# Patient Record
Sex: FEMALE | Race: WHITE
[De-identification: names, ages, dates, MRNs, and addresses within clinical notes are randomized per-mention and may not be internally consistent; named-entity substitution may affect disease eponyms.]

---

## 2017-05-27 ENCOUNTER — HOSPITAL ENCOUNTER (EMERGENCY)
Dept: HOSPITAL 33 - ED | Age: 52
Discharge: HOME | End: 2017-05-27
Payer: MEDICARE

## 2017-05-27 VITALS — OXYGEN SATURATION: 98 % | HEART RATE: 68 BPM | SYSTOLIC BLOOD PRESSURE: 130 MMHG | DIASTOLIC BLOOD PRESSURE: 62 MMHG

## 2017-05-27 DIAGNOSIS — L23.7: Primary | ICD-10-CM

## 2017-05-27 PROCEDURE — 99282 EMERGENCY DEPT VISIT SF MDM: CPT

## 2017-05-27 NOTE — ERPHSYRPT
- History of Present Illness


Time Seen by Provider: 17 07:26


Source: patient


Exam Limitations: no limitations


Patient Subjective Stated Complaint: PT REPORTS ACCIDENTLY SITTING ON POISON 

IVY WHILE FISHING-REPORTS RASH-STATES SHE IS ALLERGIC TO POISON JAGDISH ET NEEDS A 

SHOT


Triage Nursing Assessment: PT PINK WARM ET DRY-ALERT-RESP EASY ET NONLABORED-

RASH NOTED


Physician History: 





The patient is a 51-year-old female who complains of a rash that itches in her 

groin and  buttocks region for 2 days.  She is allergic to poison ivy and 3 

days ago she was sitting in a patch of poison ivy while fishing.  Now she has 

been very uncomfortable with the rash.


Timing/Duration: yesterday


Quality: itchy


Severity: moderate


Location: genitalia


Possible Causes: poison ivy


Associated Symptoms: rash


Allergies/Adverse Reactions: 








codeine Allergy (Severe, Verified 17 07:15)


 Difficulty Swallowing





Home Medications: 








Clonazepam 0.5 mg*** [Klonopin 0.5 MG***] 0.5 mg PO TID 07/15/16 [History]


Dulaglutide [Trulicity] 1.5 mg SQ WEEKLY 07/15/16 [History]


Fluoxetine HCl [Prozac] 20 mg PO DAILY 07/15/16 [History]


Levothyroxine Sodium [Synthroid] 175 mcg PO DAILY 07/15/16 [History]


Ziprasidone HCl [Geodon] 80 mg PO HS 07/15/16 [History]





Hx Tetanus, Diphtheria Vaccination/Date Given: No


Hx Influenza Vaccination/Date Given: No


Hx Pneumococcal Vaccination/Date Given: No


Immunizations Up to Date: Yes





- Review of Systems


Constitutional: No Fever, No Chills


Eyes: No Symptoms


Ears, Nose, & Throat: No Symptoms


Respiratory: No Cough, No Dyspnea


Cardiac: No Chest Pain, No Edema, No Syncope


Abdominal/Gastrointestinal: No Abdominal Pain, No Nausea, No Vomiting, No 

Diarrhea


Genitourinary Symptoms: No Dysuria


Musculoskeletal: No Back Pain, No Neck Pain


Skin: Rash


Neurological: No Dizziness, No Focal Weakness, No Sensory Changes


Psychological: No Symptoms


Endocrine: No Symptoms


Hematologic/Lymphatic: No Symptoms


Immunological/Allergic: No Symptoms


All Other Systems: Reviewed and Negative





- Past Medical History


Pertinent Past Medical History: Yes


Neurological History: Seizures


ENT History: No Pertinent History


Cardiac History: High Cholesterol, Hypertension


Respiratory History: No Pertinent History


Endocrine Medical History: Diabetes Type II, Thyroid Cancer


Musculoskeletal History: No Pertinent History


GI Medical History: No Pertinent History


 History: No Pertinent History


Psycho-Social History: Bipolar, Depression


Female Reproductive Disorders: No Pertinent History


Other Medical History: BIPOLAR 1





- Past Surgical History


Past Surgical History: Yes


Neuro Surgical History: No Pertinent History


Cardiac: Other


Respiratory: No Pertinent History


Gastrointestinal: Cholecystectomy


Genitourinary: No Pertinent History


Musculoskeletal: No Pertinent History


Female Surgical History:  Section, Hysterectomy


Other Surgical History: THYROIDectomy, cornea transplant





- Social History


Smoking Status: Current every day smoker


How long have you smoked: 36 years


Exposure to second hand smoke: No


Alcohol Use: None


Drug Use: none


Patient Lives Alone: No


Significant Family History: no pertinent family hx, diabetes, hypertension





- Female History


Hx Last Menstrual Period: HYSTERECTOMY


Hx Pregnant Now: No





- Nursing Vital Signs


Nursing Vital Signs: 





 Initial Vital Signs











Temperature                    98.8 F


 


Temperature Source             Oral


 


Pulse Rate                     71


 


Respiratory Rate               18


 


Blood Pressure [Right Arm]     135/82


 


Pain Intensity                 0

















- Physical Exam


General Appearance: mild distress


Eye Exam: PERRL/EOMI, eyes nml inspection


Ears, Nose, Throat Exam: normal ENT inspection, pharynx normal, moist mucous 

membranes


Neck Exam: normal inspection, non-tender, supple, full range of motion


Respiratory Exam: normal breath sounds, lungs clear, No respiratory distress


Cardiovascular Exam: regular rate/rhythm, normal heart sounds


Gastrointestinal/Abdomen Exam: soft, mass, No tenderness


Pelvic Exam: not done


Rectal Exam: not done


Back Exam: normal inspection, normal range of motion, No CVA tenderness, No 

vertebral tenderness


Extremity Exam: normal inspection, normal range of motion


Neurologic Exam: alert, oriented x 3, cooperative, normal mood/affect, 

sensation nml, No motor deficits


Skin Exam: rash (Examination of the inner thighs bilaterally reveals a red rash 

with vesicles consistent with poison ivy contact dermatitis.)


**SpO2 Interpretation**: normal


SpO2: 97


Oxygen Delivery: Room Air





- Departure


Time of Disposition: 07:39


Departure Disposition: Home


Clinical Impression: 


 Contact dermatitis due to poison ivy





Condition: Stable


Critical Care Time: No


Additional Instructions: 


You have contact dermatitis that was caused by poison ivy contact.  You were 

given a prescription for prednisone 60 mg daily for 5 days.  Happy fishing.


Prescriptions: 


Prednisone 10 mg*** [Deltasone 10 mg***] 60 mg PO UD #30 tablet

## 2019-12-27 ENCOUNTER — HOSPITAL ENCOUNTER (OUTPATIENT)
Dept: HOSPITAL 33 - SDC | Age: 54
Discharge: HOME | End: 2019-12-27
Attending: FAMILY MEDICINE
Payer: MEDICARE

## 2019-12-27 VITALS — OXYGEN SATURATION: 96 % | DIASTOLIC BLOOD PRESSURE: 75 MMHG | HEART RATE: 80 BPM | SYSTOLIC BLOOD PRESSURE: 120 MMHG

## 2019-12-27 DIAGNOSIS — J38.4: Primary | ICD-10-CM

## 2019-12-27 DIAGNOSIS — R13.10: ICD-10-CM

## 2019-12-27 DIAGNOSIS — Z85.89: ICD-10-CM

## 2019-12-27 DIAGNOSIS — Z79.899: ICD-10-CM

## 2019-12-27 DIAGNOSIS — I10: ICD-10-CM

## 2019-12-27 DIAGNOSIS — E11.9: ICD-10-CM

## 2019-12-27 PROCEDURE — 82962 GLUCOSE BLOOD TEST: CPT

## 2019-12-27 NOTE — OP
SURGERY DATE:    12/27/19



SURGERY TIME:      0745



PREOPERATIVE DIAGNOSIS:    

1.  HISTORY OF THROAT CANCER.

2.  DYSPHAGIA.



POSTOPERATIVE DIAGNOSIS:    

1.  PRELARYNGEAL EDEMA, OTHERWISE NORMAL EXAM.



PROCEDURE:    

1.  Esophagogastroduodenoscopy.



SURGEON:    Dr. Watts.



ANESTHESIA:    MAC. Given by the Anesthesia Department.



BRIEF HISTORY:    The patient is a 53 y/o WF presenting now for endoscopic evaluation due 
to her issues with dysphagia to rule out stricture. The patient was appraised of the risks 
of the procedure including the risk of perforation, phlebitis, untoward reaction to 
medication, bleeding, and missed lesions.  The patient verbalized her understanding and 
desired to have the procedure performed.



DESCRIPTION OF PROCEDURE:    The patient was given the medications by the Anesthesia 
Department. She had continuous pulse oximetry, ECG monitoring, intermittent BP monitoring, 
and end tidal CO2 monitoring during the examination. She was placed in the left lateral 
decubitus position.  A bite block was placed and the flexible Olympus gastroscope was used 
to intubate the oropharynx.  A view of the larynx was obtained and appeared to have areas 
of edema in the areas proximal to the vocal cords. We were however able to easily intubate 
the esophagus which appeared to be otherwise normal throughout its length without any 
evidence of any strictures.  The scope was passed in the stomach where normal gastric 
rugal folds were seen and these distended nicely with the insufflation of air. The scope 
was passed along the greater curvature of the stomach to the antrum. The pylorus was 
encountered and intubated. The duodenum was inspected and found to be normal. The scope 
was withdrawn towards the stomach. A retroflex view was obtained of the lesser curvature, 
fundus, and cardia regions of the stomach and these appeared to be essentially normal as 
well.  The scope was then withdrawn from the patient with careful inspection upon 
withdrawal. No other mucosal lesions being encountered, the scope was removed from the 
patient who tolerated the procedure well and was sent back to outpatient recovery in good 
condition.

## 2023-05-10 ENCOUNTER — HOSPITAL ENCOUNTER (OUTPATIENT)
Dept: HOSPITAL 33 - ED | Age: 58
Setting detail: OBSERVATION
LOS: 2 days | Discharge: HOME | End: 2023-05-12
Attending: FAMILY MEDICINE | Admitting: FAMILY MEDICINE
Payer: MEDICARE

## 2023-05-10 DIAGNOSIS — Z72.0: ICD-10-CM

## 2023-05-10 DIAGNOSIS — Z79.899: ICD-10-CM

## 2023-05-10 DIAGNOSIS — Z20.828: ICD-10-CM

## 2023-05-10 DIAGNOSIS — I10: ICD-10-CM

## 2023-05-10 DIAGNOSIS — J44.1: ICD-10-CM

## 2023-05-10 DIAGNOSIS — I48.20: Primary | ICD-10-CM

## 2023-05-10 DIAGNOSIS — C73: ICD-10-CM

## 2023-05-10 DIAGNOSIS — E11.9: ICD-10-CM

## 2023-05-10 LAB
ALBUMIN SERPL-MCNC: 4.4 G/DL (ref 3.5–5)
ALP SERPL-CCNC: 70 U/L (ref 38–126)
ALT SERPL-CCNC: 29 U/L (ref 0–35)
ANION GAP SERPL CALC-SCNC: 15.6 MEQ/L (ref 5–15)
APTT PPP: 24.5 SECONDS (ref 25.1–36.5)
AST SERPL QL: 34 U/L (ref 14–36)
BASOPHILS # BLD AUTO: 0.1 X10^3/UL (ref 0–0.4)
BASOPHILS NFR BLD AUTO: 1.1 % (ref 0–0.4)
BILIRUB BLD-MCNC: 0.5 MG/DL (ref 0.2–1.3)
BUN SERPL-MCNC: 16 MG/DL (ref 7–17)
CALCIUM SPEC-MCNC: 8.6 MG/DL (ref 8.4–10.2)
CHLORIDE SERPL-SCNC: 104 MMOL/L (ref 98–107)
CO2 SERPL-SCNC: 28 MMOL/L (ref 22–30)
CREAT SERPL-MCNC: 1.19 MG/DL (ref 0.52–1.04)
EOSINOPHIL # BLD AUTO: 0.24 X10^3/UL (ref 0–0.5)
FLUAV AG NPH QL IA: NEGATIVE
FLUBV AG NPH QL IA: NEGATIVE
GFR SERPLBLD BASED ON 1.73 SQ M-ARVRAT: 49.5 ML/MIN
GLUCOSE SERPL-MCNC: 115 MG/DL (ref 74–106)
HCT VFR BLD AUTO: 46.6 % (ref 35–47)
HGB BLD-MCNC: 14.5 G/DL (ref 12–16)
IMM GRANULOCYTES # BLD: 0.1 X10^3U/L (ref 0–0.03)
IMM GRANULOCYTES NFR BLD: 1.1 % (ref 0–0.4)
INR PPP: 0.94 (ref 0.8–3)
LYMPHOCYTES # SPEC AUTO: 3.28 X10^3/UL (ref 1–4.6)
MCH RBC QN AUTO: 29.7 PG (ref 26–32)
MCHC RBC AUTO-ENTMCNC: 31.1 G/DL (ref 32–36)
MONOCYTES # BLD AUTO: 0.61 X10^3/UL (ref 0–1.3)
NRBC # BLD AUTO: 0 X10^3U/L (ref 0–0.01)
NRBC BLD AUTO-RTO: 0 % (ref 0–0.1)
NT-PROBNP SERPL-MCNC: 1510 PG/ML (ref ?–300)
PLATELET # BLD AUTO: 259 X10^3/UL (ref 150–450)
POTASSIUM SERPLBLD-SCNC: 4.3 MMOL/L (ref 3.5–5.1)
PROT SERPL-MCNC: 8.1 G/DL (ref 6.3–8.2)
PROTHROMBIN TIME: 10.3 SECONDS (ref 9.4–12.5)
RBC # BLD AUTO: 4.88 X10^6/UL (ref 4.1–5.4)
RSV AG SPEC QL IA: NEGATIVE
SARS-COV-2 AG RESP QL IA.RAPID: NEGATIVE
SODIUM SERPL-SCNC: 144 MMOL/L (ref 137–145)
WBC # BLD AUTO: 9 X10^3/UL (ref 4–10.5)

## 2023-05-10 PROCEDURE — 93306 TTE W/DOPPLER COMPLETE: CPT

## 2023-05-10 PROCEDURE — 0241U: CPT

## 2023-05-10 PROCEDURE — 93268 ECG RECORD/REVIEW: CPT

## 2023-05-10 PROCEDURE — 71260 CT THORAX DX C+: CPT

## 2023-05-10 PROCEDURE — 82947 ASSAY GLUCOSE BLOOD QUANT: CPT

## 2023-05-10 PROCEDURE — 85379 FIBRIN DEGRADATION QUANT: CPT

## 2023-05-10 PROCEDURE — 94640 AIRWAY INHALATION TREATMENT: CPT

## 2023-05-10 PROCEDURE — 80048 BASIC METABOLIC PNL TOTAL CA: CPT

## 2023-05-10 PROCEDURE — G0378 HOSPITAL OBSERVATION PER HR: HCPCS

## 2023-05-10 PROCEDURE — 84439 ASSAY OF FREE THYROXINE: CPT

## 2023-05-10 PROCEDURE — 85610 PROTHROMBIN TIME: CPT

## 2023-05-10 PROCEDURE — 96374 THER/PROPH/DIAG INJ IV PUSH: CPT

## 2023-05-10 PROCEDURE — 83735 ASSAY OF MAGNESIUM: CPT

## 2023-05-10 PROCEDURE — 85025 COMPLETE CBC W/AUTO DIFF WBC: CPT

## 2023-05-10 PROCEDURE — 99292 CRITICAL CARE ADDL 30 MIN: CPT

## 2023-05-10 PROCEDURE — 84484 ASSAY OF TROPONIN QUANT: CPT

## 2023-05-10 PROCEDURE — 83721 ASSAY OF BLOOD LIPOPROTEIN: CPT

## 2023-05-10 PROCEDURE — 99291 CRITICAL CARE FIRST HOUR: CPT

## 2023-05-10 PROCEDURE — 83880 ASSAY OF NATRIURETIC PEPTIDE: CPT

## 2023-05-10 PROCEDURE — 83605 ASSAY OF LACTIC ACID: CPT

## 2023-05-10 PROCEDURE — 84443 ASSAY THYROID STIM HORMONE: CPT

## 2023-05-10 PROCEDURE — 80061 LIPID PANEL: CPT

## 2023-05-10 PROCEDURE — 83036 HEMOGLOBIN GLYCOSYLATED A1C: CPT

## 2023-05-10 PROCEDURE — 96372 THER/PROPH/DIAG INJ SC/IM: CPT

## 2023-05-10 PROCEDURE — 96376 TX/PRO/DX INJ SAME DRUG ADON: CPT

## 2023-05-10 PROCEDURE — 36415 COLL VENOUS BLD VENIPUNCTURE: CPT

## 2023-05-10 PROCEDURE — 71045 X-RAY EXAM CHEST 1 VIEW: CPT

## 2023-05-10 PROCEDURE — 80053 COMPREHEN METABOLIC PANEL: CPT

## 2023-05-10 PROCEDURE — 99285 EMERGENCY DEPT VISIT HI MDM: CPT

## 2023-05-10 PROCEDURE — 85730 THROMBOPLASTIN TIME PARTIAL: CPT

## 2023-05-10 RX ADMIN — ISODIUM CHLORIDE SCH ML: 0.03 SOLUTION RESPIRATORY (INHALATION) at 18:37

## 2023-05-10 RX ADMIN — DOXEPIN HYDROCHLORIDE SCH MG: 25 CAPSULE ORAL at 21:17

## 2023-05-10 RX ADMIN — NICOTINE SCH MG: 21 PATCH, EXTENDED RELEASE TRANSDERMAL at 18:13

## 2023-05-10 RX ADMIN — WATER SCH MG: 1 INJECTION INTRAMUSCULAR; INTRAVENOUS; SUBCUTANEOUS at 21:06

## 2023-05-10 RX ADMIN — FLUTICASONE PROPIONATE AND SALMETEROL XINAFOATE SCH PUFF: 115; 21 AEROSOL, METERED RESPIRATORY (INHALATION) at 18:44

## 2023-05-10 RX ADMIN — SIMVASTATIN SCH MG: 20 TABLET, FILM COATED ORAL at 21:07

## 2023-05-10 RX ADMIN — DILTIAZEM HYDROCHLORIDE PRN MLS/HR: 100 INJECTION, POWDER, LYOPHILIZED, FOR SOLUTION INTRAVENOUS at 20:01

## 2023-05-10 RX ADMIN — LEVOFLOXACIN SCH MG: 500 TABLET, FILM COATED ORAL at 17:42

## 2023-05-10 RX ADMIN — DILTIAZEM HYDROCHLORIDE PRN MLS/HR: 100 INJECTION, POWDER, LYOPHILIZED, FOR SOLUTION INTRAVENOUS at 19:54

## 2023-05-10 RX ADMIN — DILTIAZEM HYDROCHLORIDE PRN MLS/HR: 100 INJECTION, POWDER, LYOPHILIZED, FOR SOLUTION INTRAVENOUS at 11:10

## 2023-05-10 RX ADMIN — ISODIUM CHLORIDE SCH ML: 0.03 SOLUTION RESPIRATORY (INHALATION) at 23:10

## 2023-05-10 RX ADMIN — PANTOPRAZOLE SODIUM SCH MG: 40 INJECTION, POWDER, FOR SOLUTION INTRAVENOUS at 17:42

## 2023-05-10 RX ADMIN — LEVALBUTEROL HYDROCHLORIDE SCH MG: 1.25 SOLUTION, CONCENTRATE RESPIRATORY (INHALATION) at 23:10

## 2023-05-10 RX ADMIN — LEVALBUTEROL HYDROCHLORIDE SCH MG: 1.25 SOLUTION, CONCENTRATE RESPIRATORY (INHALATION) at 18:38

## 2023-05-10 NOTE — PCM.HP
History of Present Illness





- Chief Complaint


Chief Complaint: Aflutter w RVR


History of Present Illness: 


 is a 57 year old female who was sent to the ER by Dr Woo's 

office today, she has been having symptoms for the last 3-4 months of shortness 

of breath with some intermittent chest pain and dizziness. She was found to be a

flutter with RVR today on arrival, she is currently on a cardizem drip at 

10mg/hr with rate controlled in the 80's and she is feeling much better. she has

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

eye and hypertension. she has no cardiac history.








- Review of Systems


Constitutional: No Fever, No Chills


Respiratory: Short Of Breath


Cardiac: Chest Pain, Palpitations


Abdominal/Gastrointestinal: No Abdominal Pain, No Nausea, No Vomiting, No 

Diarrhea


Genitourinary Symptoms: No Dysuria


All Other Systems: Reviewed and Negative





Medications & Allergies


Home Medications: 


                              Home Medication List





Aripiprazole 10 mg*** [Abilify 10 MG***] 10 mg PO DAILY 19 [History 

Confirmed 05/10/23]


Doxepin HCl 75 mg PO HS 19 [History Confirmed 05/10/23]


Levothyroxine Sodium [Unithroid] 150 mcg PO 0600 19 [History Confirmed 

05/10/23]


Linagliptin [Tradjenta] 5 mg PO DAILY 19 [History Confirmed 05/10/23]


Vortioxetine Hydrobromide [Trintellix] 20 mg PO DAILY 19 [History 

Confirmed 05/10/23]


clonazePAM [Klonopin] 1 mg PO TID 19 [History Confirmed 05/10/23]


Alendronate Sodium [Fosamax] 70 mg PO WEEKLY 05/10/23 [History Confirmed 

05/10/23]


Cyclobenzaprine HCl 10 mg*** [Cyclobenzaprine 10 MG***] 10 mg PO BID 05/10/23 

[History Confirmed 05/10/23]


Fenofibrate 54 mg PO DAILY 05/10/23 [History Confirmed 05/10/23]


Fluticasone/Vilanterol [Breo Ellipta 200-25 Mcg INH] 1 each IH DAILY 05/10/23 

[History Confirmed 05/10/23]


Lisinopril 10 mg*** [Zestril 10 MG***] 10 mg PO DAILY 05/10/23 [History 

Confirmed 05/10/23]


Metformin HCl 500 mg*** [Glucophage 500 MG***] 500 mg PO BID 05/10/23 [History 

Confirmed 05/10/23]


Nitroglycerin 0.4 mg Tablet*** [Nitrostat 0.4 MG Tablet***] 0.4 mg SL Q5MIN PRN 

MR X 3 PRN 05/10/23 [History Confirmed 05/10/23]


Rosuvastatin Calcium 10 mg PO DAILY 05/10/23 [History Confirmed 05/10/23]








Allergies/Adverse Reactions: 


                                    Allergies











Allergy/AdvReac Type Severity Reaction Status Date / Time


 


codeine Allergy Severe Shortness Verified 05/10/23 10:59





   of Breath  


 


morphine Allergy   Verified 05/10/23 10:59














- Past Medical History


Past Medical History: Yes


Neurological History: No Pertinent History


ENT History: Other


Cardiac History: Hypertension


Respiratory History: Other


Endocrine Medical History: Diabetes Type II, Thyroid Cancer


Musculoskelatal History: Fractures


GI Medical History: Gallbladder Disease


 History: No Pertinent History


Pyscho-Social History: Anxiety, Other


Reproductive Disorders: No Pertinent History


Comment: Blind R eye, degenerative eye disease, multiple personality disorder,





- Past Surgical History


Past Surgical History: Yes


Neuro Surgical History: No Pertinent History


Cardiac History: No Pertinent History


Respiratory Surgery: No Pertinent History


GI Surgical History: Cholecystectomy


Genitourinary Surgical Hx: No Pertinent History


Musculskeletal Surgical Hx: No Pertinent History


Female Surgical History: Hysterectomy,  Section


Other Surgical History: hx of radical neck surgery(included removed thyroid, 

scraped vocal cords removed lymph nodes, and removed tisue from near tthe ears 

to the collar bone), hx of regina corneal transplants,





- Social History


Smoking Status: Current every day smoker


How long have you smoked: 41 years


Exposure to second hand smoke: Yes


Alcohol: None


Drug Use: none


Significant Family History: diabetes, hypertension, no pertinent family hx





- Physical Exam


Vital Signs: 


                               Vital Signs - 24 hr











  Temp Pulse Resp BP BP Pulse Ox


 


 05/10/23 15:40   86  17  120/83  


 


 05/10/23 15:29       97


 


 05/10/23 15:11   128 H  24   120/86  97


 


 05/10/23 15:10   117 H  20  120/86  


 


 05/10/23 13:08   70  12  126/88  


 


 05/10/23 13:00   76  19   126/88  94 L


 


 05/10/23 12:10   115 H  23  123/89  


 


 05/10/23 12:00   97 H  19   103/81  95


 


 05/10/23 11:20   103 H  20    97


 


 05/10/23 11:17   116 H  28 H   


 


 05/10/23 11:10   124 H  30 H  141/102  


 


 05/10/23 11:03  97.6 F  149 H  28 H   141/102  97











General Appearance: no apparent distress, obese


Neurologic Exam: alert, oriented x 3


Eye Exam: post op pupil defect (R)


Respiratory Exam: prolonged expirations, wheezing


Cardiovascular Exam: irregular


Gastrointestinal/Abdomen Exam: soft, normal bowel sounds, No tenderness, No mass


Extremity Exam: normal inspection, normal range of motion, pelvis stable


Skin Exam: normal color, warm, dry, No rash





Results





- Labs


Lab/Micro Results: 


                            Lab Results-Last 24 Hours











  05/10/23 05/10/23 05/10/23 Range/Units





  11:07 11:07 11:07 


 


WBC  9.0    (4.0-10.5)  x10^3/uL


 


RBC  4.88    (4.1-5.4)  x10^6/uL


 


Hgb  14.5    (12.0-16.0)  g/dL


 


Hct  46.6    (35-47)  %


 


MCV  95.5    ()  fL


 


MCH  29.7    (26-32)  pg


 


MCHC  31.1 L    (32-36)  g/dL


 


RDW  13.3    (11.5-14.0)  %


 


Plt Count  259    (150-450)  x10^3/uL


 


MPV  10.6    (7.5-11.0)  fL


 


Gran %  51.9    (36.0-66.0)  %


 


Immature Gran % (Auto)  1.1 H    (0.00-0.4)  %


 


Nucleat RBC Rel Count  0.0    (0.00-0.1)  %


 


Eos # (Auto)  0.24    (0-0.5)  x10^3/uL


 


Immature Gran # (Auto)  0.10 H    (0.00-0.03)  x10^3u/L


 


Absolute Lymphs (auto)  3.28    (1.0-4.6)  x10^3/uL


 


Absolute Monos (auto)  0.61    (0.0-1.3)  x10^3/uL


 


Absolute Nucleated RBC  0.00    (0.00-0.01)  x10^3u/L


 


Lymphocytes %  36.4    (24.0-44.0)  %


 


Monocytes %  6.8    (0.0-12.0)  %


 


Eosinophils %  2.7    (0.00-5.0)  %


 


Basophils %  1.1    (0.0-0.4)  %


 


Absolute Granulocytes  4.67    (1.4-6.9)  x10^3/uL


 


Basophils #  0.10    (0-0.4)  x10^3/uL


 


PT    10.3  (9.4-12.5)  SECONDS


 


INR    0.94  (0.8-3.0)  


 


APTT    24.5 L  (25.1-36.5)  SECONDS


 


D-Dimer     (0.0-0.50)  mg/L


 


Sodium   144   (137-145)  mmol/L


 


Potassium   4.3   (3.5-5.1)  mmol/L


 


Chloride   104   ()  mmol/L


 


Carbon Dioxide   28   (22-30)  mmol/L


 


Anion Gap   15.6 H   (5-15)  MEQ/L


 


BUN   16   (7-17)  mg/dL


 


Creatinine   1.19 H   (0.52-1.04)  mg/dL


 


Estimated GFR   49.5   ML/MIN


 


Glucose   115 H   ()  mg/dL


 


POC Glucometer     (74 to 106)  mg/dL


 


Lactic Acid     (0.4-2.0)  


 


Calcium   8.6   (8.4-10.2)  mg/dL


 


Total Bilirubin   0.50   (0.2-1.3)  mg/dL


 


AST   34   (14-36)  U/L


 


ALT   29   (0-35)  U/L


 


Alkaline Phosphatase   70   ()  U/L


 


Troponin I     (0.000-0.034)  ng/mL


 


NT-Pro-B Natriuret Pep   1510   (<300)  pg/mL


 


Serum Total Protein   8.1   (6.3-8.2)  g/dL


 


Albumin   4.4   (3.5-5.0)  g/dL


 


Influenza Type A Ag     (NEGATIVE)  


 


Influenza Type B Ag     (NEGATIVE)  


 


RSV (PCR)     (NEGATIVE)  


 


SARS-CoV-2 (PCR)     (NEGATIVE)  














  05/10/23 05/10/23 05/10/23 Range/Units





  11:07 11:16 11:30 


 


WBC     (4.0-10.5)  x10^3/uL


 


RBC     (4.1-5.4)  x10^6/uL


 


Hgb     (12.0-16.0)  g/dL


 


Hct     (35-47)  %


 


MCV     ()  fL


 


MCH     (26-32)  pg


 


MCHC     (32-36)  g/dL


 


RDW     (11.5-14.0)  %


 


Plt Count     (150-450)  x10^3/uL


 


MPV     (7.5-11.0)  fL


 


Gran %     (36.0-66.0)  %


 


Immature Gran % (Auto)     (0.00-0.4)  %


 


Nucleat RBC Rel Count     (0.00-0.1)  %


 


Eos # (Auto)     (0-0.5)  x10^3/uL


 


Immature Gran # (Auto)     (0.00-0.03)  x10^3u/L


 


Absolute Lymphs (auto)     (1.0-4.6)  x10^3/uL


 


Absolute Monos (auto)     (0.0-1.3)  x10^3/uL


 


Absolute Nucleated RBC     (0.00-0.01)  x10^3u/L


 


Lymphocytes %     (24.0-44.0)  %


 


Monocytes %     (0.0-12.0)  %


 


Eosinophils %     (0.00-5.0)  %


 


Basophils %     (0.0-0.4)  %


 


Absolute Granulocytes     (1.4-6.9)  x10^3/uL


 


Basophils #     (0-0.4)  x10^3/uL


 


PT     (9.4-12.5)  SECONDS


 


INR     (0.8-3.0)  


 


APTT     (25.1-36.5)  SECONDS


 


D-Dimer     (0.0-0.50)  mg/L


 


Sodium     (137-145)  mmol/L


 


Potassium     (3.5-5.1)  mmol/L


 


Chloride     ()  mmol/L


 


Carbon Dioxide     (22-30)  mmol/L


 


Anion Gap     (5-15)  MEQ/L


 


BUN     (7-17)  mg/dL


 


Creatinine     (0.52-1.04)  mg/dL


 


Estimated GFR     ML/MIN


 


Glucose     ()  mg/dL


 


POC Glucometer   130 H   (74 to 106)  mg/dL


 


Lactic Acid    1.6  (0.4-2.0)  


 


Calcium     (8.4-10.2)  mg/dL


 


Total Bilirubin     (0.2-1.3)  mg/dL


 


AST     (14-36)  U/L


 


ALT     (0-35)  U/L


 


Alkaline Phosphatase     ()  U/L


 


Troponin I  < 0.012    (0.000-0.034)  ng/mL


 


NT-Pro-B Natriuret Pep     (<300)  pg/mL


 


Serum Total Protein     (6.3-8.2)  g/dL


 


Albumin     (3.5-5.0)  g/dL


 


Influenza Type A Ag     (NEGATIVE)  


 


Influenza Type B Ag     (NEGATIVE)  


 


RSV (PCR)     (NEGATIVE)  


 


SARS-CoV-2 (PCR)     (NEGATIVE)  














  05/10/23 05/10/23 05/10/23 Range/Units





  11:30 11:36 15:06 


 


WBC     (4.0-10.5)  x10^3/uL


 


RBC     (4.1-5.4)  x10^6/uL


 


Hgb     (12.0-16.0)  g/dL


 


Hct     (35-47)  %


 


MCV     ()  fL


 


MCH     (26-32)  pg


 


MCHC     (32-36)  g/dL


 


RDW     (11.5-14.0)  %


 


Plt Count     (150-450)  x10^3/uL


 


MPV     (7.5-11.0)  fL


 


Gran %     (36.0-66.0)  %


 


Immature Gran % (Auto)     (0.00-0.4)  %


 


Nucleat RBC Rel Count     (0.00-0.1)  %


 


Eos # (Auto)     (0-0.5)  x10^3/uL


 


Immature Gran # (Auto)     (0.00-0.03)  x10^3u/L


 


Absolute Lymphs (auto)     (1.0-4.6)  x10^3/uL


 


Absolute Monos (auto)     (0.0-1.3)  x10^3/uL


 


Absolute Nucleated RBC     (0.00-0.01)  x10^3u/L


 


Lymphocytes %     (24.0-44.0)  %


 


Monocytes %     (0.0-12.0)  %


 


Eosinophils %     (0.00-5.0)  %


 


Basophils %     (0.0-0.4)  %


 


Absolute Granulocytes     (1.4-6.9)  x10^3/uL


 


Basophils #     (0-0.4)  x10^3/uL


 


PT     (9.4-12.5)  SECONDS


 


INR     (0.8-3.0)  


 


APTT     (25.1-36.5)  SECONDS


 


D-Dimer   0.53 H   (0.0-0.50)  mg/L


 


Sodium     (137-145)  mmol/L


 


Potassium     (3.5-5.1)  mmol/L


 


Chloride     ()  mmol/L


 


Carbon Dioxide     (22-30)  mmol/L


 


Anion Gap     (5-15)  MEQ/L


 


BUN     (7-17)  mg/dL


 


Creatinine     (0.52-1.04)  mg/dL


 


Estimated GFR     ML/MIN


 


Glucose     ()  mg/dL


 


POC Glucometer     (74 to 106)  mg/dL


 


Lactic Acid     (0.4-2.0)  


 


Calcium     (8.4-10.2)  mg/dL


 


Total Bilirubin     (0.2-1.3)  mg/dL


 


AST     (14-36)  U/L


 


ALT     (0-35)  U/L


 


Alkaline Phosphatase     ()  U/L


 


Troponin I    < 0.012  (0.000-0.034)  ng/mL


 


NT-Pro-B Natriuret Pep     (<300)  pg/mL


 


Serum Total Protein     (6.3-8.2)  g/dL


 


Albumin     (3.5-5.0)  g/dL


 


Influenza Type A Ag  NEGATIVE    (NEGATIVE)  


 


Influenza Type B Ag  NEGATIVE    (NEGATIVE)  


 


RSV (PCR)  NEGATIVE    (NEGATIVE)  


 


SARS-CoV-2 (PCR)  NEGATIVE    (NEGATIVE)  














  05/10/23 Range/Units





  16:50 


 


WBC   (4.0-10.5)  x10^3/uL


 


RBC   (4.1-5.4)  x10^6/uL


 


Hgb   (12.0-16.0)  g/dL


 


Hct   (35-47)  %


 


MCV   ()  fL


 


MCH   (26-32)  pg


 


MCHC   (32-36)  g/dL


 


RDW   (11.5-14.0)  %


 


Plt Count   (150-450)  x10^3/uL


 


MPV   (7.5-11.0)  fL


 


Gran %   (36.0-66.0)  %


 


Immature Gran % (Auto)   (0.00-0.4)  %


 


Nucleat RBC Rel Count   (0.00-0.1)  %


 


Eos # (Auto)   (0-0.5)  x10^3/uL


 


Immature Gran # (Auto)   (0.00-0.03)  x10^3u/L


 


Absolute Lymphs (auto)   (1.0-4.6)  x10^3/uL


 


Absolute Monos (auto)   (0.0-1.3)  x10^3/uL


 


Absolute Nucleated RBC   (0.00-0.01)  x10^3u/L


 


Lymphocytes %   (24.0-44.0)  %


 


Monocytes %   (0.0-12.0)  %


 


Eosinophils %   (0.00-5.0)  %


 


Basophils %   (0.0-0.4)  %


 


Absolute Granulocytes   (1.4-6.9)  x10^3/uL


 


Basophils #   (0-0.4)  x10^3/uL


 


PT   (9.4-12.5)  SECONDS


 


INR   (0.8-3.0)  


 


APTT   (25.1-36.5)  SECONDS


 


D-Dimer   (0.0-0.50)  mg/L


 


Sodium   (137-145)  mmol/L


 


Potassium   (3.5-5.1)  mmol/L


 


Chloride   ()  mmol/L


 


Carbon Dioxide   (22-30)  mmol/L


 


Anion Gap   (5-15)  MEQ/L


 


BUN   (7-17)  mg/dL


 


Creatinine   (0.52-1.04)  mg/dL


 


Estimated GFR   ML/MIN


 


Glucose   ()  mg/dL


 


POC Glucometer  106  (74 to 106)  mg/dL


 


Lactic Acid   (0.4-2.0)  


 


Calcium   (8.4-10.2)  mg/dL


 


Total Bilirubin   (0.2-1.3)  mg/dL


 


AST   (14-36)  U/L


 


ALT   (0-35)  U/L


 


Alkaline Phosphatase   ()  U/L


 


Troponin I   (0.000-0.034)  ng/mL


 


NT-Pro-B Natriuret Pep   (<300)  pg/mL


 


Serum Total Protein   (6.3-8.2)  g/dL


 


Albumin   (3.5-5.0)  g/dL


 


Influenza Type A Ag   (NEGATIVE)  


 


Influenza Type B Ag   (NEGATIVE)  


 


RSV (PCR)   (NEGATIVE)  


 


SARS-CoV-2 (PCR)   (NEGATIVE)  








                                   Accuchecks











Date                           05/10/23


 


Time                           11:16

















- Radiology Impressions


Radiology Exams & Impressions: 


                              Radiology Procedures











 Category Date Time Status


 


 CHEST 1 VIEW (PORTABLE) Stat Exams  05/10/23 11:01 Completed


 


 CHEST WITH CONTRAST [CT] Stat Exams  05/10/23 13:08 Completed


 


 ECHO W/2D AND DOPPLER [US] Routine Exams  05/10/23 17:03 Ordered














- Other Procedures and Tests


                               Respiratory Therapy





05/10/23 15:33


Oxygen Nasal Cannula 2 lpm 














Assessment/Plan


(1) Atrial flutter with rapid ventricular response


Current Visit: Yes   Status: Acute   


Assessment & Plan: 


rate currently controlled with cardizem drip and anticoagulation ordered lovenox

 1mg/kg q12 hrs, will check thyroid function tests, r/o MI with serial enzymes 

and check echo. with intermittent chest pain and a long list of cardiac risk 

factors she will need evaluated for CAD with stress testing after discharge. 


Code(s): I48.92 - UNSPECIFIED ATRIAL FLUTTER   





(2) COPD exacerbation


Current Visit: Yes   Status: Acute   


Assessment & Plan: 


levaquin and IV solu medrol ordered due to wheezing, dyspnea and oxygen 

requirement.


Code(s): J44.1 - CHRONIC OBSTRUCTIVE PULMONARY DISEASE W (ACUTE) EXACERBATION   





(3) Type 2 diabetes mellitus


Current Visit: No   Status: Acute   


Assessment & Plan: 


sliding scale insulin ordered, will monitor due to steroids.

## 2023-05-10 NOTE — XRAY
Indication: Arrhythmia.  Short of breath.  Pulmonary embolus.



Multiple contiguous axial images obtained through the chest using 100 cc

Isovue 370 contrast and PE protocol.



Comparison: None



Good opacification of the pulmonary arteries to include the lobar and

segmental branches.  No pulmonary embolus.  Heart is not enlarged.  Aorta

minimally arteriosclerotic without aneurysm/dissection.  No pathologic

mediastinal/hilar lymphadenopathy.  Lungs demonstrates mild bibasilar

subsegmental atelectasis/scarring.  Posterior right upper lobe demonstrates 8

x 9 mm indeterminate noncalcified nodule.  No infiltrate, effusion, or

pneumothorax.



Bony thorax intact with minimal degenerative changes throughout the spine.



Limited upper abdomen demonstrates fatty liver and cholecystectomy clips.



Impression:

1.  Negative pulmonary embolus.  No acute cardiopulmonary abnormalities.

2.  Right upper lobe indeterminant noncalcified nodule.  Outside comparison

studies recommended if available.  If not, recommend follow-up per Fleischner

guidelines.

3.  Incidental fatty liver.

## 2023-05-10 NOTE — ERPHSYRPT
- History of Present Illness


Source: patient


Exam Limitations: no limitations


Patient Subjective Stated Complaint: C/O SOB that started a little over a week 

ago. Denies any pain.


Triage Nursing Assessment: Patient came back to ER in a w/c; she came from Dr. Woo's office. She is SOB; labored breathing at rest, unable to be flat in 

bed. She is diaphoretic. Some edema noted to BLE. Lungs diminished but wheezing.


Physician History: 





58 yo WF w h/o DM/HTN/Tobacco abuse presents from Dr. Woo's clinic w 

Aflutter w RVR. Spoke w Dr. Woo who stated that pt was new to him and that 

Aflutter was probably new. He recommended IV Cardizem treatment. Pt states that 

she has had dyspnea x 1+month and palpatations x1+ month. She denies h/o Afib/

Aflutter/MI/CAD/PE. Pt has a chronic cough which has not changed and denies 

chest pain/fever/nausea/vomiting/diarrhea/abdominal pain. Arrived w Aflutter w 

RVR/Rate 147.


Timing/Duration: other (1+ month)


Modifying Factors: Improves With: nothing


Nitro Today/Relief: no nitro taken today


Aspirin Treatment Today: no aspirin today


Allergies/Adverse Reactions: 








codeine Allergy (Severe, Verified 05/10/23 10:59)


   Shortness of Breath


morphine Allergy (Verified 05/10/23 10:59)


   





Home Medications: 








Aripiprazole 10 mg*** [Abilify 10 MG***] 10 mg PO DAILY 19 [History]


Doxepin HCl 75 mg PO HS 19 [History]


Levothyroxine Sodium [Unithroid] 150 mcg PO 0600 19 [History]


Linagliptin [Tradjenta] 5 mg PO DAILY 19 [History]


Vortioxetine Hydrobromide [Trintellix] 20 mg PO DAILY 19 [History]


clonazePAM [Klonopin] 1 mg PO TID 19 [History]


Alendronate Sodium [Fosamax] 70 mg PO WEEKLY 05/10/23 [History]


Cyclobenzaprine HCl 10 mg*** [Cyclobenzaprine 10 MG***] 10 mg PO BID 05/10/23 

[History]


Fenofibrate 54 mg PO DAILY 05/10/23 [History]


Fluticasone/Vilanterol [Breo Ellipta 200-25 Mcg INH] 1 each IH DAILY 05/10/23 

[History]


Lisinopril 10 mg*** [Zestril 10 MG***] 10 mg PO DAILY 05/10/23 [History]


Metformin HCl 500 mg*** [Glucophage 500 MG***] 500 mg PO BID 05/10/23 [History]


Nitroglycerin 0.4 mg Tablet*** [Nitrostat 0.4 MG Tablet***] 0.4 mg SL Q5MIN PRN 

MR X 3 PRN 05/10/23 [History]


Rosuvastatin Calcium 10 mg PO DAILY 05/10/23 [History]





Hx Tetanus, Diphtheria Vaccination/Date Given: Yes


Hx Influenza Vaccination/Date Given: No


Hx Pneumococcal Vaccination/Date Given: No


Immunizations Up to Date: Yes





Travel Risk





- International Travel


Have you traveled outside of the country in past 3 weeks: No





- Coronavirus Screening


Are you exhibiting any of the following symptoms?: Yes


Symptoms: Shortness of Breath


Close contact with a COVID-19 positive Pt in past 14-21 Days: No





- Vaccine Status


Have you recieved a Covid-19 vaccination: Yes


: [employer]





- Review of Systems


Constitutional: No Symptoms, Malaise, Weakness


Eyes: No Symptoms


Ears, Nose, & Throat: No Symptoms


Respiratory: No Symptoms, Cough, Dyspnea, Dyspnea on Exertion (PARIKH)


Cardiac: No Symptoms, Palpitations


Abdominal/Gastrointestinal: No Symptoms


Genitourinary Symptoms: No Symptoms


Musculoskeletal: No Symptoms


Skin: No Symptoms


Neurological: No Symptoms


Psychological: No Symptoms


Endocrine: No Symptoms


Hematologic/Lymphatic: No Symptoms


Immunological/Allergic: No Symptoms





- Past Medical History


Pertinent Past Medical History: Yes


Neurological History: No Pertinent History


ENT History: Other


Cardiac History: Hypertension


Respiratory History: Other


Endocrine Medical History: Diabetes Type II, Thyroid Cancer


Musculoskeletal History: Fractures


GI Medical History: Gallbladder Disease


 History: No Pertinent History


Psycho-Social History: Anxiety, Other


Female Reproductive Disorders: No Pertinent History


Other Medical History: Blind R eye, degenerative eye disease, multiple 

personality disorder,





- Past Surgical History


Past Surgical History: Yes


Neuro Surgical History: No Pertinent History


Cardiac: No Pertinent History


Respiratory: No Pertinent History


Gastrointestinal: Cholecystectomy


Genitourinary: No Pertinent History


Musculoskeletal: No Pertinent History


Female Surgical History: Hysterectomy,  Section


Other Surgical History: hx of radical neck surgery(included removed thyroid, 

scraped vocal cords removed lymph nodes, and removed tisue from near tthe ears 

to the collar bone), hx of regina corneal transplants,





- Social History


Smoking Status: Current every day smoker


How long have you smoked: 41 years


Exposure to second hand smoke: Yes


Alcohol Use: None


Drug Use: none


Patient Lives Alone: No


Significant Family History: diabetes, hypertension, no pertinent family hx





- Nursing Vital Signs


Nursing Vital Signs: 


                               Initial Vital Signs











Temperature  97.6 F   05/10/23 11:03


 


Pulse Rate  149 H  05/10/23 11:03


 


Respiratory Rate  28 H  05/10/23 11:03


 


Blood Pressure  141/102   05/10/23 11:03


 


O2 Sat by Pulse Oximetry  97   05/10/23 11:03








                                   Pain Scale











Pain Intensity                 0











Tachy/Hypertensive





- Physical Exam


General Appearance: moderate distress


Eye Exam: PERRL/EOMI, eyes nml inspection


Ears, Nose, Throat Exam: normal ENT inspection, TMs normal, pharynx normal, 

moist mucous membranes


Neck Exam: normal inspection, non-tender, supple, full range of motion, No 

meningismus, No mass, No Brudzinski, No Kernig's


Respiratory Exam: wheezing (Scattered wheezes/Decreased at bases B)


Cardiovascular Exam: tachycardia, capillary refill <2 sec


Gastrointestinal/Abdomen Exam: soft, normal bowel sounds, No tenderness


Back Exam: normal inspection, normal range of motion


Extremity Exam: normal inspection, normal range of motion


Neurologic Exam: alert, oriented x 3, cooperative, CNs II-XII nml as tested, 

sensation nml


Skin Exam: normal color, warm, dry


Lymphatic Exam: No adenopathy


**SpO2 Interpretation**: normal


SpO2: 97


O2 Delivery: Room Air





- Course


Nursing assessment & vital signs reviewed: Yes


EKG Interpreted by Me: RATE (Aflutter/RVR rate 147/Prolonged QTc/Low voltage)





- Radiology Exams


  ** Chest


X-ray Interpretation: Discussed w/ radiologist (CXR neg)





- CT Exams


  ** Chest


CT Interpretation: Discussed w/radiologist (CTA of chest-No PE/RUL nodule)


Ordered Tests: 


                               Active Orders 24 hr











 Category Date Time Status


 


 Consistent Carbohydrate Diet  Calorie Diet  05/10/23 Dinner Active


 


 CHEST 1 VIEW (PORTABLE) Stat Exams  05/10/23 11:01 Completed


 


 CHEST WITH CONTRAST [CT] Stat Exams  05/10/23 13:08 Completed


 


 CBC W DIFF AM.LAB Lab  23 04:00 Ordered


 


 CBC W DIFF Stat Lab  05/10/23 11:07 Completed


 


 CMP AM.LAB Lab  23 04:00 Ordered


 


 CMP Stat Lab  05/10/23 11:07 Completed


 


 D-DIMER QUANTITATIVE Stat Lab  05/10/23 11:36 Completed


 


 Lactic Acid Stat Lab  05/10/23 11:30 Completed


 


 NT PRO BNPII Stat Lab  05/10/23 11:07 Completed


 


 POCT GLUCOSE Stat Lab  05/10/23 11:16 Completed


 


 PROTIME WITH INR Stat Lab  05/10/23 11:07 Completed


 


 PTT Stat Lab  05/10/23 11:07 Completed


 


 TROPONIN Q4H Lab  05/10/23 11:07 Completed


 


 TROPONIN Q4H Lab  05/10/23 15:06 Completed


 


 TROPONIN Q4H Lab  05/10/23 18:30 Received


 


 Transfer Order Routine Transfer  05/10/23 Completed








Medication Summary











Generic Name Dose Route Start Last Admin





  Trade Name Freq  PRN Reason Stop Dose Admin


 


Aripiprazole  10 mg  23 10:00 





  Aripiprazole 10 Mg Tablet  PO  06/10/23 09:59 





  DAILY ZACK  


 


Clonazepam  1 mg  05/10/23 22:00 





  Clonazepam 2 Mg Tablet  PO  23 21:59 





  TID ZACK  


 


Methylprednisolone Sodium  0 mg  05/10/23 22:00 





Succinate 80 mg/ Sterile Water  IV  23 21:59 





2 ml  Q8HT ZACK  


 


Doxepin HCl  75 mg  05/10/23 22:00 





  Doxepin Hcl 25 Mg Capsule  PO  23 21:59 





  HS ZACK  


 


Enoxaparin Sodium  120 mg  05/10/23 22:00 





  Enoxaparin Sodium*** 120 Mg/0.8 Ml Syringe  SQ  23 21:59 





  Q12HT ZACK  


 


Fenofibrate  54 mg  05/10/23 20:00 





  Fenofibrate,Micronized 145 Mg Tablet  PO  23 19:59 





  DAILY ZACK  


 


Diltiazem HCl  100 mls @ 5 mls/hr  05/10/23 11:00  05/10/23 19:05





  Cardizem Drip 100 Mg/100 Ml D5w  IV  23 10:59  5 mg/hr





  .Q20H PRN   5 mls/hr





  HEART RATE/ A-FIB   Titration





  Protocol  





  5 MG/HR  


 


Insulin Human Lispro  0 unit  05/10/23 15:33 





  Insulin Lispro 1 Unit  SQ  23 15:32 





  UD PRN  





  HYPERGLYCEMIA  


 


Levalbuterol HCl  1.25 mg  05/10/23 19:00  05/10/23 18:38





  Levalbuterol Hcl 1.25 Mg/0.5 Ml Neb    23 18:59  1.25 mg





  Q4HRT ZACK   Administration


 


Levofloxacin  500 mg  05/10/23 18:00  05/10/23 17:42





  Levofloxacin 500 Mg Tablet  PO  23 17:59  500 mg





  DAILY ZACK   Administration


 


Levothyroxine Sodium  150 mcg  23 06:00 





  Levothyroxine Sodium 150 Mcg Tablet  PO  06/10/23 05:59 





  QAM@0600 ZACK  


 


Lisinopril  10 mg  05/10/23 20:00 





  Lisinopril 10 Mg Tablet  PO  23 19:59 





  DAILY ZACK  


 


Metoprolol Tartrate  50 mg  23 15:37  05/10/23 15:41





  Metoprolol Tartrate 50 Mg Tablet  PO  23 15:38  50 mg





  STAT ONE   Administration


 


Nicotine  21 mg  05/10/23 18:00  05/10/23 18:13





  Nicotine 21 Mg/Patch Patch  TOP  23 17:59  21 mg





  Q24H ZACK   Administration


 


Nitroglycerin  0.4 mg  05/10/23 18:09 





  Nitroglycerin 0.4 Mg Tablet Bottle  SL  23 18:08 





  Q5MIN PRN MR X 3 PRN  





  CHEST PAIN  


 


Non-Formulary Medication  1 each  05/10/23 14:35 





  Hold Metformin Products For 48hrs    23 14:35 





  UD ZACK  


 


Ondansetron HCl  4 mg  05/10/23 15:33 





  Ondansetron Hcl 4 Mg/2 Ml Vial  IV  23 15:32 





  Q6H PRN PRN  





  NAUSEA/VOMITING  


 


Pantoprazole Sodium  40 mg  05/10/23 17:00  05/10/23 17:42





  Pantoprazole 40 Mg Vial  IV  23 16:59  40 mg





  Q24H10 ZACK   Administration


 


Fluticasone/Salmeterol  2 puff  05/10/23 19:00  05/10/23 18:44





  Fluticasone/Salmeterol 115/21 - 120 Puff Common Canister    23 18:59  

2 puff





  BIDRT ZACK   Administration


 


Simvastatin  20 mg  05/10/23 22:00 





  Simvastatin 20 Mg Tablet  PO  23 21:59 





  HS ZACK  


 


Sodium Chloride  3 ml  05/10/23 19:00  05/10/23 18:37





  Sodium Cl For Inhalation 3 Ml Ud Nebule    23 18:59  3 ml





  Q4HRT ZACK   Administration














Discontinued Medications














Generic Name Dose Route Start Last Admin





  Trade Name Freq  PRN Reason Stop Dose Admin


 


Albuterol/Ipratropium  3 ml  05/10/23 11:18  05/10/23 11:20





  Ipratropium/Albuterol Sulfate 3 Ml Ampul.Neb  IH  05/10/23 11:19  3 ml





  STAT ONE   Administration


 


Albuterol/Ipratropium  Confirm  05/10/23 11:22 





  Ipratropium/Albuterol Sulfate 3 Ml Ampul.Neb  Administered  05/10/23 11:23 





  Dose  





  3 ml  





  IH  





  .STK-MED ONE  


 


Albuterol/Ipratropium  3 ml  05/10/23 19:00 





  Ipratropium/Albuterol Sulfate 3 Ml Ampul.Neb    23 18:59 





  Q6HRT ZACK  


 


Diltiazem HCl  15 mg  05/10/23 11:00  05/10/23 11:05





  Diltiazem Hcl Iv 5 Mg/Ml Vial  IV  05/10/23 11:01  15 mg





  STAT ONE   Administration


 


Diltiazem HCl  10 mg  05/10/23 12:08  05/10/23 12:31





  Diltiazem Hcl Iv 5 Mg/Ml Vial  IV  05/10/23 12:09  10 mg





  STAT ONE   Administration


 


Diltiazem HCl  Confirm  05/10/23 12:30 





  Diltiazem Hcl Iv 5 Mg/Ml Vial  Administered  05/10/23 12:31 





  Dose  





  50 mg  





  IV  





  .STK-MED ONE  


 


Enoxaparin Sodium  120 mg  05/10/23 15:37  05/10/23 15:41





  Enoxaparin Sodium*** 120 Mg/0.8 Ml Syringe  SQ  05/10/23 15:38  120 mg





  STAT STA   Administration


 


Enoxaparin Sodium  Confirm  05/10/23 15:41 





  Enoxaparin Sodium*** 120 Mg/0.8 Ml Syringe  Administered  05/10/23 15:42 





  Dose  





  120 mg  





  SQ  





  .STK-MED ONE  


 


Metoprolol Tartrate  Confirm  05/10/23 15:40 





  Metoprolol Tartrate 50 Mg Tablet  Administered  05/10/23 15:41 





  Dose  





  50 mg  





  .ROUTE  





  .[employer]-MED ONE  











Lab/Rad Data: 


                           Laboratory Result Diagrams





                                 05/10/23 11:07 





                                 05/10/23 11:07 





                               Laboratory Results











  05/10/23 05/10/23 05/10/23 Range/Units





  15:06 11:36 11:30 


 


WBC     (4.0-10.5)  x10^3/uL


 


RBC     (4.1-5.4)  x10^6/uL


 


Hgb     (12.0-16.0)  g/dL


 


Hct     (35-47)  %


 


MCV     ()  fL


 


MCH     (26-32)  pg


 


MCHC     (32-36)  g/dL


 


RDW     (11.5-14.0)  %


 


Plt Count     (150-450)  x10^3/uL


 


MPV     (7.5-11.0)  fL


 


Gran %     (36.0-66.0)  %


 


Immature Gran % (Auto)     (0.00-0.4)  %


 


Nucleat RBC Rel Count     (0.00-0.1)  %


 


Eos # (Auto)     (0-0.5)  x10^3/uL


 


Immature Gran # (Auto)     (0.00-0.03)  x10^3u/L


 


Absolute Lymphs (auto)     (1.0-4.6)  x10^3/uL


 


Absolute Monos (auto)     (0.0-1.3)  x10^3/uL


 


Absolute Nucleated RBC     (0.00-0.01)  x10^3u/L


 


Lymphocytes %     (24.0-44.0)  %


 


Monocytes %     (0.0-12.0)  %


 


Eosinophils %     (0.00-5.0)  %


 


Basophils %     (0.0-0.4)  %


 


Absolute Granulocytes     (1.4-6.9)  x10^3/uL


 


Basophils #     (0-0.4)  x10^3/uL


 


PT     (9.4-12.5)  SECONDS


 


INR     (0.8-3.0)  


 


APTT     (25.1-36.5)  SECONDS


 


D-Dimer   0.53 H   (0.0-0.50)  mg/L


 


Sodium     (137-145)  mmol/L


 


Potassium     (3.5-5.1)  mmol/L


 


Chloride     ()  mmol/L


 


Carbon Dioxide     (22-30)  mmol/L


 


Anion Gap     (5-15)  MEQ/L


 


BUN     (7-17)  mg/dL


 


Creatinine     (0.52-1.04)  mg/dL


 


Estimated GFR     ML/MIN


 


Glucose     ()  mg/dL


 


POC Glucometer     (74 to 106)  mg/dL


 


Hemoglobin A1c     (4.5-6.0)  %


 


Lactic Acid     (0.4-2.0)  


 


Calcium     (8.4-10.2)  mg/dL


 


Total Bilirubin     (0.2-1.3)  mg/dL


 


AST     (14-36)  U/L


 


ALT     (0-35)  U/L


 


Alkaline Phosphatase     ()  U/L


 


Troponin I  < 0.012    (0.000-0.034)  ng/mL


 


NT-Pro-B Natriuret Pep     (<300)  pg/mL


 


Serum Total Protein     (6.3-8.2)  g/dL


 


Albumin     (3.5-5.0)  g/dL


 


Free T4     (0.78-2.19)  ng/dL


 


TSH 3rd Generation     (0.47-4.68)  mIU/L


 


Influenza Type A Ag    NEGATIVE  (NEGATIVE)  


 


Influenza Type B Ag    NEGATIVE  (NEGATIVE)  


 


RSV (PCR)    NEGATIVE  (NEGATIVE)  


 


SARS-CoV-2 (PCR)    NEGATIVE  (NEGATIVE)  














  05/10/23 05/10/23 05/10/23 Range/Units





  11:30 11:16 11:07 


 


WBC     (4.0-10.5)  x10^3/uL


 


RBC     (4.1-5.4)  x10^6/uL


 


Hgb     (12.0-16.0)  g/dL


 


Hct     (35-47)  %


 


MCV     ()  fL


 


MCH     (26-32)  pg


 


MCHC     (32-36)  g/dL


 


RDW     (11.5-14.0)  %


 


Plt Count     (150-450)  x10^3/uL


 


MPV     (7.5-11.0)  fL


 


Gran %     (36.0-66.0)  %


 


Immature Gran % (Auto)     (0.00-0.4)  %


 


Nucleat RBC Rel Count     (0.00-0.1)  %


 


Eos # (Auto)     (0-0.5)  x10^3/uL


 


Immature Gran # (Auto)     (0.00-0.03)  x10^3u/L


 


Absolute Lymphs (auto)     (1.0-4.6)  x10^3/uL


 


Absolute Monos (auto)     (0.0-1.3)  x10^3/uL


 


Absolute Nucleated RBC     (0.00-0.01)  x10^3u/L


 


Lymphocytes %     (24.0-44.0)  %


 


Monocytes %     (0.0-12.0)  %


 


Eosinophils %     (0.00-5.0)  %


 


Basophils %     (0.0-0.4)  %


 


Absolute Granulocytes     (1.4-6.9)  x10^3/uL


 


Basophils #     (0-0.4)  x10^3/uL


 


PT     (9.4-12.5)  SECONDS


 


INR     (0.8-3.0)  


 


APTT     (25.1-36.5)  SECONDS


 


D-Dimer     (0.0-0.50)  mg/L


 


Sodium     (137-145)  mmol/L


 


Potassium     (3.5-5.1)  mmol/L


 


Chloride     ()  mmol/L


 


Carbon Dioxide     (22-30)  mmol/L


 


Anion Gap     (5-15)  MEQ/L


 


BUN     (7-17)  mg/dL


 


Creatinine     (0.52-1.04)  mg/dL


 


Estimated GFR     ML/MIN


 


Glucose     ()  mg/dL


 


POC Glucometer   130 H   (74 to 106)  mg/dL


 


Hemoglobin A1c    6.16 H  (4.5-6.0)  %


 


Lactic Acid  1.6    (0.4-2.0)  


 


Calcium     (8.4-10.2)  mg/dL


 


Total Bilirubin     (0.2-1.3)  mg/dL


 


AST     (14-36)  U/L


 


ALT     (0-35)  U/L


 


Alkaline Phosphatase     ()  U/L


 


Troponin I     (0.000-0.034)  ng/mL


 


NT-Pro-B Natriuret Pep     (<300)  pg/mL


 


Serum Total Protein     (6.3-8.2)  g/dL


 


Albumin     (3.5-5.0)  g/dL


 


Free T4     (0.78-2.19)  ng/dL


 


TSH 3rd Generation     (0.47-4.68)  mIU/L


 


Influenza Type A Ag     (NEGATIVE)  


 


Influenza Type B Ag     (NEGATIVE)  


 


RSV (PCR)     (NEGATIVE)  


 


SARS-CoV-2 (PCR)     (NEGATIVE)  














  05/10/23 05/10/23 05/10/23 Range/Units





  11:07 11:07 11:07 


 


WBC     (4.0-10.5)  x10^3/uL


 


RBC     (4.1-5.4)  x10^6/uL


 


Hgb     (12.0-16.0)  g/dL


 


Hct     (35-47)  %


 


MCV     ()  fL


 


MCH     (26-32)  pg


 


MCHC     (32-36)  g/dL


 


RDW     (11.5-14.0)  %


 


Plt Count     (150-450)  x10^3/uL


 


MPV     (7.5-11.0)  fL


 


Gran %     (36.0-66.0)  %


 


Immature Gran % (Auto)     (0.00-0.4)  %


 


Nucleat RBC Rel Count     (0.00-0.1)  %


 


Eos # (Auto)     (0-0.5)  x10^3/uL


 


Immature Gran # (Auto)     (0.00-0.03)  x10^3u/L


 


Absolute Lymphs (auto)     (1.0-4.6)  x10^3/uL


 


Absolute Monos (auto)     (0.0-1.3)  x10^3/uL


 


Absolute Nucleated RBC     (0.00-0.01)  x10^3u/L


 


Lymphocytes %     (24.0-44.0)  %


 


Monocytes %     (0.0-12.0)  %


 


Eosinophils %     (0.00-5.0)  %


 


Basophils %     (0.0-0.4)  %


 


Absolute Granulocytes     (1.4-6.9)  x10^3/uL


 


Basophils #     (0-0.4)  x10^3/uL


 


PT     (9.4-12.5)  SECONDS


 


INR     (0.8-3.0)  


 


APTT     (25.1-36.5)  SECONDS


 


D-Dimer     (0.0-0.50)  mg/L


 


Sodium     (137-145)  mmol/L


 


Potassium     (3.5-5.1)  mmol/L


 


Chloride     ()  mmol/L


 


Carbon Dioxide     (22-30)  mmol/L


 


Anion Gap     (5-15)  MEQ/L


 


BUN     (7-17)  mg/dL


 


Creatinine     (0.52-1.04)  mg/dL


 


Estimated GFR     ML/MIN


 


Glucose     ()  mg/dL


 


POC Glucometer     (74 to 106)  mg/dL


 


Hemoglobin A1c     (4.5-6.0)  %


 


Lactic Acid     (0.4-2.0)  


 


Calcium     (8.4-10.2)  mg/dL


 


Total Bilirubin     (0.2-1.3)  mg/dL


 


AST     (14-36)  U/L


 


ALT     (0-35)  U/L


 


Alkaline Phosphatase     ()  U/L


 


Troponin I    < 0.012  (0.000-0.034)  ng/mL


 


NT-Pro-B Natriuret Pep     (<300)  pg/mL


 


Serum Total Protein     (6.3-8.2)  g/dL


 


Albumin     (3.5-5.0)  g/dL


 


Free T4  2.12    (0.78-2.19)  ng/dL


 


TSH 3rd Generation   3.150   (0.47-4.68)  mIU/L


 


Influenza Type A Ag     (NEGATIVE)  


 


Influenza Type B Ag     (NEGATIVE)  


 


RSV (PCR)     (NEGATIVE)  


 


SARS-CoV-2 (PCR)     (NEGATIVE)  














  05/10/23 05/10/23 05/10/23 Range/Units





  11:07 11:07 11:07 


 


WBC    9.0  (4.0-10.5)  x10^3/uL


 


RBC    4.88  (4.1-5.4)  x10^6/uL


 


Hgb    14.5  (12.0-16.0)  g/dL


 


Hct    46.6  (35-47)  %


 


MCV    95.5  ()  fL


 


MCH    29.7  (26-32)  pg


 


MCHC    31.1 L  (32-36)  g/dL


 


RDW    13.3  (11.5-14.0)  %


 


Plt Count    259  (150-450)  x10^3/uL


 


MPV    10.6  (7.5-11.0)  fL


 


Gran %    51.9  (36.0-66.0)  %


 


Immature Gran % (Auto)    1.1 H  (0.00-0.4)  %


 


Nucleat RBC Rel Count    0.0  (0.00-0.1)  %


 


Eos # (Auto)    0.24  (0-0.5)  x10^3/uL


 


Immature Gran # (Auto)    0.10 H  (0.00-0.03)  x10^3u/L


 


Absolute Lymphs (auto)    3.28  (1.0-4.6)  x10^3/uL


 


Absolute Monos (auto)    0.61  (0.0-1.3)  x10^3/uL


 


Absolute Nucleated RBC    0.00  (0.00-0.01)  x10^3u/L


 


Lymphocytes %    36.4  (24.0-44.0)  %


 


Monocytes %    6.8  (0.0-12.0)  %


 


Eosinophils %    2.7  (0.00-5.0)  %


 


Basophils %    1.1  (0.0-0.4)  %


 


Absolute Granulocytes    4.67  (1.4-6.9)  x10^3/uL


 


Basophils #    0.10  (0-0.4)  x10^3/uL


 


PT  10.3    (9.4-12.5)  SECONDS


 


INR  0.94    (0.8-3.0)  


 


APTT  24.5 L    (25.1-36.5)  SECONDS


 


D-Dimer     (0.0-0.50)  mg/L


 


Sodium   144   (137-145)  mmol/L


 


Potassium   4.3   (3.5-5.1)  mmol/L


 


Chloride   104   ()  mmol/L


 


Carbon Dioxide   28   (22-30)  mmol/L


 


Anion Gap   15.6 H   (5-15)  MEQ/L


 


BUN   16   (7-17)  mg/dL


 


Creatinine   1.19 H   (0.52-1.04)  mg/dL


 


Estimated GFR   49.5   ML/MIN


 


Glucose   115 H   ()  mg/dL


 


POC Glucometer     (74 to 106)  mg/dL


 


Hemoglobin A1c     (4.5-6.0)  %


 


Lactic Acid     (0.4-2.0)  


 


Calcium   8.6   (8.4-10.2)  mg/dL


 


Total Bilirubin   0.50   (0.2-1.3)  mg/dL


 


AST   34   (14-36)  U/L


 


ALT   29   (0-35)  U/L


 


Alkaline Phosphatase   70   ()  U/L


 


Troponin I     (0.000-0.034)  ng/mL


 


NT-Pro-B Natriuret Pep   1510   (<300)  pg/mL


 


Serum Total Protein   8.1   (6.3-8.2)  g/dL


 


Albumin   4.4   (3.5-5.0)  g/dL


 


Free T4     (0.78-2.19)  ng/dL


 


TSH 3rd Generation     (0.47-4.68)  mIU/L


 


Influenza Type A Ag     (NEGATIVE)  


 


Influenza Type B Ag     (NEGATIVE)  


 


RSV (PCR)     (NEGATIVE)  


 


SARS-CoV-2 (PCR)     (NEGATIVE)  














- Progress


Progress Note: 





05/10/23 19:20


Nursing note and vital signs reviewed


No food or housing insecurities noted


Spoke w Dr. Woo before pt sent to ER from Clinic


Additional history per sister


Pt arrived from [facility] w Aflutter w RVR


RVR treated w IV [id number] IV/Cardizem drip


Additional 10mg IV Cardizem given for rate control


50mg po Lopressor


Observation per Dr. Garcia


Pt's rate controlled w treatment upon obs admit


All labs reviewed and shared w pt/sister


CXR/CTA chest reviewed and shared w pt/sister





05/10/23 19:22





05/10/23 19:23





Counseled pt/family regarding: lab results, diagnosis, need for follow-up, rad 

results





Medical Desision Making





- Independent Historian


Additional History obtained from: Relative/friend





- Discussion of managment


Care discussed with:: specialist


Reviewed:: Test results


Agreed on:: Treatment plan, place in obs


Will see patient: in hospital





- Diagnostic Testing


Radiological Interpretation: Reviewed by me, Discussed w/ radiologist





- Risk of complications


The pt has a high risk of morbidity or mortality based on: Drug therapy 

requiring intensive monitoring for toxicity





- Departure


Clinical Impression: 


 Atrial flutter





Condition: Stable


Critical Care Time: Yes


Critical Care Time(excluding separately billable procedures): Critical  

mins

## 2023-05-10 NOTE — XRAY
Indication: Dyspnea.



Comparison: November 23, 2021



Portable chest again demonstrates normal heart and lungs.  Bony thorax intact

again with osteopenia.  No new/acute findings.

## 2023-05-11 LAB
ALBUMIN SERPL-MCNC: 4.3 G/DL (ref 3.5–5)
ALP SERPL-CCNC: 65 U/L (ref 38–126)
ALT SERPL-CCNC: 29 U/L (ref 0–35)
ANION GAP SERPL CALC-SCNC: 13.6 MEQ/L (ref 5–15)
AST SERPL QL: 29 U/L (ref 14–36)
BASOPHILS # BLD AUTO: 0.05 X10^3/UL (ref 0–0.4)
BASOPHILS NFR BLD AUTO: 0.7 % (ref 0–0.4)
BILIRUB BLD-MCNC: 0.5 MG/DL (ref 0.2–1.3)
BUN SERPL-MCNC: 16 MG/DL (ref 7–17)
CALCIUM SPEC-MCNC: 8.5 MG/DL (ref 8.4–10.2)
CHLORIDE SERPL-SCNC: 105 MMOL/L (ref 98–107)
CHOLESTANOL SERPL-MCNC: 135 MG/DL (ref 50–200)
CO2 SERPL-SCNC: 25 MMOL/L (ref 22–30)
CREAT SERPL-MCNC: 0.98 MG/DL (ref 0.52–1.04)
EOSINOPHIL # BLD AUTO: 0.01 X10^3/UL (ref 0–0.5)
GFR SERPLBLD BASED ON 1.73 SQ M-ARVRAT: > 60 ML/MIN
GLUCOSE SERPL-MCNC: 171 MG/DL (ref 74–106)
HCT VFR BLD AUTO: 43 % (ref 35–47)
HDLC SERPL-MCNC: 27 MG/DL (ref 40–60)
HGB BLD-MCNC: 13.7 G/DL (ref 12–16)
IMM GRANULOCYTES # BLD: 0.07 X10^3U/L (ref 0–0.03)
IMM GRANULOCYTES NFR BLD: 1 % (ref 0–0.4)
LDLC SERPL DIRECT ASSAY-MCNC: 81 MG/DL (ref 30–100)
LYMPHOCYTES # SPEC AUTO: 1 X10^3/UL (ref 1–4.6)
MCH RBC QN AUTO: 30 PG (ref 26–32)
MCHC RBC AUTO-ENTMCNC: 31.9 G/DL (ref 32–36)
MONOCYTES # BLD AUTO: 0.06 X10^3/UL (ref 0–1.3)
NRBC # BLD AUTO: 0 X10^3U/L (ref 0–0.01)
NRBC BLD AUTO-RTO: 0 % (ref 0–0.1)
PLATELET # BLD AUTO: 222 X10^3/UL (ref 150–450)
POTASSIUM SERPLBLD-SCNC: 4.8 MMOL/L (ref 3.5–5.1)
PROT SERPL-MCNC: 7.8 G/DL (ref 6.3–8.2)
RBC # BLD AUTO: 4.57 X10^6/UL (ref 4.1–5.4)
SODIUM SERPL-SCNC: 139 MMOL/L (ref 137–145)
TRIGL SERPL-MCNC: 123 MG/DL (ref 30–150)
WBC # BLD AUTO: 7.2 X10^3/UL (ref 4–10.5)

## 2023-05-11 RX ADMIN — ISODIUM CHLORIDE SCH ML: 0.03 SOLUTION RESPIRATORY (INHALATION) at 22:45

## 2023-05-11 RX ADMIN — LEVALBUTEROL HYDROCHLORIDE SCH: 1.25 SOLUTION, CONCENTRATE RESPIRATORY (INHALATION) at 03:37

## 2023-05-11 RX ADMIN — PANTOPRAZOLE SODIUM SCH MG: 40 INJECTION, POWDER, FOR SOLUTION INTRAVENOUS at 09:03

## 2023-05-11 RX ADMIN — LEVALBUTEROL HYDROCHLORIDE SCH MG: 1.25 SOLUTION, CONCENTRATE RESPIRATORY (INHALATION) at 05:10

## 2023-05-11 RX ADMIN — NICOTINE SCH MG: 21 PATCH, EXTENDED RELEASE TRANSDERMAL at 17:28

## 2023-05-11 RX ADMIN — APIXABAN SCH MG: 2.5 TABLET, FILM COATED ORAL at 22:04

## 2023-05-11 RX ADMIN — ISODIUM CHLORIDE SCH ML: 0.03 SOLUTION RESPIRATORY (INHALATION) at 10:12

## 2023-05-11 RX ADMIN — ISODIUM CHLORIDE SCH ML: 0.03 SOLUTION RESPIRATORY (INHALATION) at 17:49

## 2023-05-11 RX ADMIN — ISODIUM CHLORIDE SCH ML: 0.03 SOLUTION RESPIRATORY (INHALATION) at 05:10

## 2023-05-11 RX ADMIN — WATER SCH MG: 1 INJECTION INTRAMUSCULAR; INTRAVENOUS; SUBCUTANEOUS at 22:05

## 2023-05-11 RX ADMIN — WATER SCH MG: 1 INJECTION INTRAMUSCULAR; INTRAVENOUS; SUBCUTANEOUS at 05:33

## 2023-05-11 RX ADMIN — ISODIUM CHLORIDE SCH ML: 0.03 SOLUTION RESPIRATORY (INHALATION) at 14:21

## 2023-05-11 RX ADMIN — LEVOFLOXACIN SCH MG: 500 TABLET, FILM COATED ORAL at 09:04

## 2023-05-11 RX ADMIN — SIMVASTATIN SCH MG: 20 TABLET, FILM COATED ORAL at 22:07

## 2023-05-11 RX ADMIN — LEVALBUTEROL HYDROCHLORIDE SCH MG: 1.25 SOLUTION, CONCENTRATE RESPIRATORY (INHALATION) at 14:21

## 2023-05-11 RX ADMIN — ISODIUM CHLORIDE SCH: 0.03 SOLUTION RESPIRATORY (INHALATION) at 03:37

## 2023-05-11 RX ADMIN — LEVALBUTEROL HYDROCHLORIDE SCH MG: 1.25 SOLUTION, CONCENTRATE RESPIRATORY (INHALATION) at 17:50

## 2023-05-11 RX ADMIN — LEVALBUTEROL HYDROCHLORIDE SCH MG: 1.25 SOLUTION, CONCENTRATE RESPIRATORY (INHALATION) at 10:13

## 2023-05-11 RX ADMIN — INSULIN LISPRO PRN UNIT: 100 INJECTION, SOLUTION INTRAVENOUS; SUBCUTANEOUS at 08:00

## 2023-05-11 RX ADMIN — DOXEPIN HYDROCHLORIDE SCH MG: 25 CAPSULE ORAL at 22:04

## 2023-05-11 RX ADMIN — FLUTICASONE PROPIONATE AND SALMETEROL XINAFOATE SCH PUFF: 115; 21 AEROSOL, METERED RESPIRATORY (INHALATION) at 17:49

## 2023-05-11 RX ADMIN — LEVOTHYROXINE SODIUM SCH MCG: 150 TABLET ORAL at 05:33

## 2023-05-11 RX ADMIN — INSULIN LISPRO PRN UNIT: 100 INJECTION, SOLUTION INTRAVENOUS; SUBCUTANEOUS at 17:28

## 2023-05-11 RX ADMIN — INSULIN LISPRO PRN UNIT: 100 INJECTION, SOLUTION INTRAVENOUS; SUBCUTANEOUS at 22:05

## 2023-05-11 RX ADMIN — INSULIN LISPRO PRN UNIT: 100 INJECTION, SOLUTION INTRAVENOUS; SUBCUTANEOUS at 12:10

## 2023-05-11 RX ADMIN — FLUTICASONE PROPIONATE AND SALMETEROL XINAFOATE SCH PUFF: 115; 21 AEROSOL, METERED RESPIRATORY (INHALATION) at 06:01

## 2023-05-11 RX ADMIN — WATER SCH MG: 1 INJECTION INTRAMUSCULAR; INTRAVENOUS; SUBCUTANEOUS at 14:11

## 2023-05-11 RX ADMIN — LEVALBUTEROL HYDROCHLORIDE SCH MG: 1.25 SOLUTION, CONCENTRATE RESPIRATORY (INHALATION) at 22:45

## 2023-05-11 RX ADMIN — APIXABAN SCH MG: 2.5 TABLET, FILM COATED ORAL at 09:03

## 2023-05-11 NOTE — [email or web address]
Date and Time: 05/11/23 0834





Subjective Assessment: 





heart rate is controlled on cardizem at 2mg/hr currently, bp is stable. she is 

feeling better, still on oxygen





Objective Exam


General Appearance: no apparent distress


Neurologic Exam: alert, oriented x 3


Respiratory Exam: prolonged expirations, No wheezing


Cardiovascular Exam: irregular


Gastrointestinal/Abdomen Exam: soft, No tenderness, No mass


Extremity Exam: normal inspection, normal range of motion





OBJECTIVE DATA


Vital Signs: 


                               Vital Signs - 24 hr











  Temp Pulse Resp BP BP BP Pulse Ox


 


 05/11/23 08:00   101 H  21  115/75    93 L


 


 05/11/23 07:51   97 H     


 


 05/11/23 07:25  96.6 F      


 


 05/11/23 07:00   96 H  21  134/88    94 L


 


 05/11/23 06:00   93 H  18  116/75    93 L


 


 05/11/23 05:30   93 H  22  117/77    92 L


 


 05/11/23 05:12   89  20     95


 


 05/11/23 05:00   92 H  20  129/84    93 L


 


 05/11/23 04:30   90  23  121/88    94 L


 


 05/11/23 04:00   88  17  117/86    93 L


 


 05/11/23 03:30   91 H  20  122/61    93 L


 


 05/11/23 03:00   87  19  123/93    94 L


 


 05/11/23 02:30   88  20  118/80    95


 


 05/11/23 02:00   82  20  111/86    94 L


 


 05/11/23 01:30   76  19  103/80    94 L


 


 05/11/23 01:00   84  19  106/78    94 L


 


 05/11/23 00:30   85  17  117/95    96


 


 05/11/23 00:01   76     


 


 05/11/23 00:00   76  19  99/80    95


 


 05/10/23 23:30   88  25 H  108/79    95


 


 05/10/23 23:11   67  16     96


 


 05/10/23 23:00   73  22  108/79    95


 


 05/10/23 22:30   72  20  96/71    96


 


 05/10/23 22:00   68  20  108/79    98


 


 05/10/23 21:45   70  29 H  98/71    98


 


 05/10/23 21:30   69  21  93/70    97


 


 05/10/23 21:15   68  17  95/73    98


 


 05/10/23 21:06   69  27 H  94/66    97


 


 05/10/23 21:01   69  24  94/66   


 


 05/10/23 21:00   75  27 H  86/64    98


 


 05/10/23 20:54   75  20  86/64   


 


 05/10/23 20:45   69  17  91/69    96


 


 05/10/23 20:30   70  21  87/66    95


 


 05/10/23 20:15   69  23  100/67    96


 


 05/10/23 20:01   68  24  84/54   


 


 05/10/23 20:00   73  21  84/54    97


 


 05/10/23 19:54   70  17  90/70   


 


 05/10/23 19:26        97


 


 05/10/23 19:06   68  17  90/70    98


 


 05/10/23 19:02   68  30 H  85/61    97


 


 05/10/23 19:00   68  25 H  85/60    97


 


 05/10/23 18:40   68  16     97


 


 05/10/23 18:07   69  27 H  98/78    98


 


 05/10/23 18:02   71  23  103/81    97


 


 05/10/23 17:02   69  16  115/82    97


 


 05/10/23 16:37  98.7 F  82  19   96/80   98


 


 05/10/23 16:36   78  16  96/80    97


 


 05/10/23 15:40   86  17  120/83   


 


 05/10/23 15:35        98


 


 05/10/23 15:11   128 H  24    120/86  97


 


 05/10/23 15:10   117 H  20  120/86   


 


 05/10/23 13:08   70  12  126/88   


 


 05/10/23 13:00   76  19    126/88  94 L


 


 05/10/23 12:10   115 H  23  123/89   


 


 05/10/23 12:00   97 H  19    103/81  95


 


 05/10/23 11:20   103 H  20     97


 


 05/10/23 11:17   116 H  28 H    


 


 05/10/23 11:10   124 H  30 H  141/102   


 


 05/10/23 11:03  97.6 F  149 H  28 H    141/102  97








                        Pain Assessment - Last Documented











Pain Intensity                 0











Intake and Output: 


                                 Intake & Output











 05/08/23 05/09/23 05/10/23 05/11/23





 11:59 11:59 11:59 11:59


 


Intake Total    928


 


Output Total    900


 


Balance    28


 


Weight   139.9 kg 137.3 kg











Lab Results: 


                            Lab Results-Last 24 Hours











  05/10/23 05/10/23 05/10/23 Range/Units





  11:07 11:07 11:07 


 


WBC  9.0    (4.0-10.5)  x10^3/uL


 


RBC  4.88    (4.1-5.4)  x10^6/uL


 


Hgb  14.5    (12.0-16.0)  g/dL


 


Hct  46.6    (35-47)  %


 


MCV  95.5    ()  fL


 


MCH  29.7    (26-32)  pg


 


MCHC  31.1 L    (32-36)  g/dL


 


RDW  13.3    (11.5-14.0)  %


 


Plt Count  259    (150-450)  x10^3/uL


 


MPV  10.6    (7.5-11.0)  fL


 


Gran %  51.9    (36.0-66.0)  %


 


Immature Gran % (Auto)  1.1 H    (0.00-0.4)  %


 


Nucleat RBC Rel Count  0.0    (0.00-0.1)  %


 


Eos # (Auto)  0.24    (0-0.5)  x10^3/uL


 


Immature Gran # (Auto)  0.10 H    (0.00-0.03)  x10^3u/L


 


Absolute Lymphs (auto)  3.28    (1.0-4.6)  x10^3/uL


 


Absolute Monos (auto)  0.61    (0.0-1.3)  x10^3/uL


 


Absolute Nucleated RBC  0.00    (0.00-0.01)  x10^3u/L


 


Lymphocytes %  36.4    (24.0-44.0)  %


 


Monocytes %  6.8    (0.0-12.0)  %


 


Eosinophils %  2.7    (0.00-5.0)  %


 


Basophils %  1.1    (0.0-0.4)  %


 


Absolute Granulocytes  4.67    (1.4-6.9)  x10^3/uL


 


Basophils #  0.10    (0-0.4)  x10^3/uL


 


PT    10.3  (9.4-12.5)  SECONDS


 


INR    0.94  (0.8-3.0)  


 


APTT    24.5 L  (25.1-36.5)  SECONDS


 


D-Dimer     (0.0-0.50)  mg/L


 


Sodium   144   (137-145)  mmol/L


 


Potassium   4.3   (3.5-5.1)  mmol/L


 


Chloride   104   ()  mmol/L


 


Carbon Dioxide   28   (22-30)  mmol/L


 


Anion Gap   15.6 H   (5-15)  MEQ/L


 


BUN   16   (7-17)  mg/dL


 


Creatinine   1.19 H   (0.52-1.04)  mg/dL


 


Estimated GFR   49.5   ML/MIN


 


Glucose   115 H   ()  mg/dL


 


POC Glucometer     (74 to 106)  mg/dL


 


Hemoglobin A1c     (4.5-6.0)  %


 


Lactic Acid     (0.4-2.0)  


 


Calcium   8.6   (8.4-10.2)  mg/dL


 


Total Bilirubin   0.50   (0.2-1.3)  mg/dL


 


AST   34   (14-36)  U/L


 


ALT   29   (0-35)  U/L


 


Alkaline Phosphatase   70   ()  U/L


 


Troponin I     (0.000-0.034)  ng/mL


 


NT-Pro-B Natriuret Pep   1510   (<300)  pg/mL


 


Serum Total Protein   8.1   (6.3-8.2)  g/dL


 


Albumin   4.4   (3.5-5.0)  g/dL


 


Triglycerides     ()  mg/dL


 


Cholesterol     ()  mg/dL


 


LDL Cholesterol     ()  mg/dL


 


HDL Cholesterol     (40-60)  mg/dL


 


Heart Disease Risk Ratio     


 


Free T4     (0.78-2.19)  ng/dL


 


TSH 3rd Generation     (0.47-4.68)  mIU/L


 


Influenza Type A Ag     (NEGATIVE)  


 


Influenza Type B Ag     (NEGATIVE)  


 


RSV (PCR)     (NEGATIVE)  


 


SARS-CoV-2 (PCR)     (NEGATIVE)  














  05/10/23 05/10/23 05/10/23 Range/Units





  11:07 11:07 11:07 


 


WBC     (4.0-10.5)  x10^3/uL


 


RBC     (4.1-5.4)  x10^6/uL


 


Hgb     (12.0-16.0)  g/dL


 


Hct     (35-47)  %


 


MCV     ()  fL


 


MCH     (26-32)  pg


 


MCHC     (32-36)  g/dL


 


RDW     (11.5-14.0)  %


 


Plt Count     (150-450)  x10^3/uL


 


MPV     (7.5-11.0)  fL


 


Gran %     (36.0-66.0)  %


 


Immature Gran % (Auto)     (0.00-0.4)  %


 


Nucleat RBC Rel Count     (0.00-0.1)  %


 


Eos # (Auto)     (0-0.5)  x10^3/uL


 


Immature Gran # (Auto)     (0.00-0.03)  x10^3u/L


 


Absolute Lymphs (auto)     (1.0-4.6)  x10^3/uL


 


Absolute Monos (auto)     (0.0-1.3)  x10^3/uL


 


Absolute Nucleated RBC     (0.00-0.01)  x10^3u/L


 


Lymphocytes %     (24.0-44.0)  %


 


Monocytes %     (0.0-12.0)  %


 


Eosinophils %     (0.00-5.0)  %


 


Basophils %     (0.0-0.4)  %


 


Absolute Granulocytes     (1.4-6.9)  x10^3/uL


 


Basophils #     (0-0.4)  x10^3/uL


 


PT     (9.4-12.5)  SECONDS


 


INR     (0.8-3.0)  


 


APTT     (25.1-36.5)  SECONDS


 


D-Dimer     (0.0-0.50)  mg/L


 


Sodium     (137-145)  mmol/L


 


Potassium     (3.5-5.1)  mmol/L


 


Chloride     ()  mmol/L


 


Carbon Dioxide     (22-30)  mmol/L


 


Anion Gap     (5-15)  MEQ/L


 


BUN     (7-17)  mg/dL


 


Creatinine     (0.52-1.04)  mg/dL


 


Estimated GFR     ML/MIN


 


Glucose     ()  mg/dL


 


POC Glucometer     (74 to 106)  mg/dL


 


Hemoglobin A1c     (4.5-6.0)  %


 


Lactic Acid     (0.4-2.0)  


 


Calcium     (8.4-10.2)  mg/dL


 


Total Bilirubin     (0.2-1.3)  mg/dL


 


AST     (14-36)  U/L


 


ALT     (0-35)  U/L


 


Alkaline Phosphatase     ()  U/L


 


Troponin I  < 0.012    (0.000-0.034)  ng/mL


 


NT-Pro-B Natriuret Pep     (<300)  pg/mL


 


Serum Total Protein     (6.3-8.2)  g/dL


 


Albumin     (3.5-5.0)  g/dL


 


Triglycerides     ()  mg/dL


 


Cholesterol     ()  mg/dL


 


LDL Cholesterol     ()  mg/dL


 


HDL Cholesterol     (40-60)  mg/dL


 


Heart Disease Risk Ratio     


 


Free T4    2.12  (0.78-2.19)  ng/dL


 


TSH 3rd Generation   3.150   (0.47-4.68)  mIU/L


 


Influenza Type A Ag     (NEGATIVE)  


 


Influenza Type B Ag     (NEGATIVE)  


 


RSV (PCR)     (NEGATIVE)  


 


SARS-CoV-2 (PCR)     (NEGATIVE)  














  05/10/23 05/10/23 05/10/23 Range/Units





  11:07 11:16 11:30 


 


WBC     (4.0-10.5)  x10^3/uL


 


RBC     (4.1-5.4)  x10^6/uL


 


Hgb     (12.0-16.0)  g/dL


 


Hct     (35-47)  %


 


MCV     ()  fL


 


MCH     (26-32)  pg


 


MCHC     (32-36)  g/dL


 


RDW     (11.5-14.0)  %


 


Plt Count     (150-450)  x10^3/uL


 


MPV     (7.5-11.0)  fL


 


Gran %     (36.0-66.0)  %


 


Immature Gran % (Auto)     (0.00-0.4)  %


 


Nucleat RBC Rel Count     (0.00-0.1)  %


 


Eos # (Auto)     (0-0.5)  x10^3/uL


 


Immature Gran # (Auto)     (0.00-0.03)  x10^3u/L


 


Absolute Lymphs (auto)     (1.0-4.6)  x10^3/uL


 


Absolute Monos (auto)     (0.0-1.3)  x10^3/uL


 


Absolute Nucleated RBC     (0.00-0.01)  x10^3u/L


 


Lymphocytes %     (24.0-44.0)  %


 


Monocytes %     (0.0-12.0)  %


 


Eosinophils %     (0.00-5.0)  %


 


Basophils %     (0.0-0.4)  %


 


Absolute Granulocytes     (1.4-6.9)  x10^3/uL


 


Basophils #     (0-0.4)  x10^3/uL


 


PT     (9.4-12.5)  SECONDS


 


INR     (0.8-3.0)  


 


APTT     (25.1-36.5)  SECONDS


 


D-Dimer     (0.0-0.50)  mg/L


 


Sodium     (137-145)  mmol/L


 


Potassium     (3.5-5.1)  mmol/L


 


Chloride     ()  mmol/L


 


Carbon Dioxide     (22-30)  mmol/L


 


Anion Gap     (5-15)  MEQ/L


 


BUN     (7-17)  mg/dL


 


Creatinine     (0.52-1.04)  mg/dL


 


Estimated GFR     ML/MIN


 


Glucose     ()  mg/dL


 


POC Glucometer   130 H   (74 to 106)  mg/dL


 


Hemoglobin A1c  6.16 H    (4.5-6.0)  %


 


Lactic Acid    1.6  (0.4-2.0)  


 


Calcium     (8.4-10.2)  mg/dL


 


Total Bilirubin     (0.2-1.3)  mg/dL


 


AST     (14-36)  U/L


 


ALT     (0-35)  U/L


 


Alkaline Phosphatase     ()  U/L


 


Troponin I     (0.000-0.034)  ng/mL


 


NT-Pro-B Natriuret Pep     (<300)  pg/mL


 


Serum Total Protein     (6.3-8.2)  g/dL


 


Albumin     (3.5-5.0)  g/dL


 


Triglycerides     ()  mg/dL


 


Cholesterol     ()  mg/dL


 


LDL Cholesterol     ()  mg/dL


 


HDL Cholesterol     (40-60)  mg/dL


 


Heart Disease Risk Ratio     


 


Free T4     (0.78-2.19)  ng/dL


 


TSH 3rd Generation     (0.47-4.68)  mIU/L


 


Influenza Type A Ag     (NEGATIVE)  


 


Influenza Type B Ag     (NEGATIVE)  


 


RSV (PCR)     (NEGATIVE)  


 


SARS-CoV-2 (PCR)     (NEGATIVE)  














  05/10/23 05/10/23 05/10/23 Range/Units





  11:30 11:36 15:06 


 


WBC     (4.0-10.5)  x10^3/uL


 


RBC     (4.1-5.4)  x10^6/uL


 


Hgb     (12.0-16.0)  g/dL


 


Hct     (35-47)  %


 


MCV     ()  fL


 


MCH     (26-32)  pg


 


MCHC     (32-36)  g/dL


 


RDW     (11.5-14.0)  %


 


Plt Count     (150-450)  x10^3/uL


 


MPV     (7.5-11.0)  fL


 


Gran %     (36.0-66.0)  %


 


Immature Gran % (Auto)     (0.00-0.4)  %


 


Nucleat RBC Rel Count     (0.00-0.1)  %


 


Eos # (Auto)     (0-0.5)  x10^3/uL


 


Immature Gran # (Auto)     (0.00-0.03)  x10^3u/L


 


Absolute Lymphs (auto)     (1.0-4.6)  x10^3/uL


 


Absolute Monos (auto)     (0.0-1.3)  x10^3/uL


 


Absolute Nucleated RBC     (0.00-0.01)  x10^3u/L


 


Lymphocytes %     (24.0-44.0)  %


 


Monocytes %     (0.0-12.0)  %


 


Eosinophils %     (0.00-5.0)  %


 


Basophils %     (0.0-0.4)  %


 


Absolute Granulocytes     (1.4-6.9)  x10^3/uL


 


Basophils #     (0-0.4)  x10^3/uL


 


PT     (9.4-12.5)  SECONDS


 


INR     (0.8-3.0)  


 


APTT     (25.1-36.5)  SECONDS


 


D-Dimer   0.53 H   (0.0-0.50)  mg/L


 


Sodium     (137-145)  mmol/L


 


Potassium     (3.5-5.1)  mmol/L


 


Chloride     ()  mmol/L


 


Carbon Dioxide     (22-30)  mmol/L


 


Anion Gap     (5-15)  MEQ/L


 


BUN     (7-17)  mg/dL


 


Creatinine     (0.52-1.04)  mg/dL


 


Estimated GFR     ML/MIN


 


Glucose     ()  mg/dL


 


POC Glucometer     (74 to 106)  mg/dL


 


Hemoglobin A1c     (4.5-6.0)  %


 


Lactic Acid     (0.4-2.0)  


 


Calcium     (8.4-10.2)  mg/dL


 


Total Bilirubin     (0.2-1.3)  mg/dL


 


AST     (14-36)  U/L


 


ALT     (0-35)  U/L


 


Alkaline Phosphatase     ()  U/L


 


Troponin I    < 0.012  (0.000-0.034)  ng/mL


 


NT-Pro-B Natriuret Pep     (<300)  pg/mL


 


Serum Total Protein     (6.3-8.2)  g/dL


 


Albumin     (3.5-5.0)  g/dL


 


Triglycerides     ()  mg/dL


 


Cholesterol     ()  mg/dL


 


LDL Cholesterol     ()  mg/dL


 


HDL Cholesterol     (40-60)  mg/dL


 


Heart Disease Risk Ratio     


 


Free T4     (0.78-2.19)  ng/dL


 


TSH 3rd Generation     (0.47-4.68)  mIU/L


 


Influenza Type A Ag  NEGATIVE    (NEGATIVE)  


 


Influenza Type B Ag  NEGATIVE    (NEGATIVE)  


 


RSV (PCR)  NEGATIVE    (NEGATIVE)  


 


SARS-CoV-2 (PCR)  NEGATIVE    (NEGATIVE)  














  05/10/23 05/10/23 05/10/23 Range/Units





  16:50 18:30 20:48 


 


WBC     (4.0-10.5)  x10^3/uL


 


RBC     (4.1-5.4)  x10^6/uL


 


Hgb     (12.0-16.0)  g/dL


 


Hct     (35-47)  %


 


MCV     ()  fL


 


MCH     (26-32)  pg


 


MCHC     (32-36)  g/dL


 


RDW     (11.5-14.0)  %


 


Plt Count     (150-450)  x10^3/uL


 


MPV     (7.5-11.0)  fL


 


Gran %     (36.0-66.0)  %


 


Immature Gran % (Auto)     (0.00-0.4)  %


 


Nucleat RBC Rel Count     (0.00-0.1)  %


 


Eos # (Auto)     (0-0.5)  x10^3/uL


 


Immature Gran # (Auto)     (0.00-0.03)  x10^3u/L


 


Absolute Lymphs (auto)     (1.0-4.6)  x10^3/uL


 


Absolute Monos (auto)     (0.0-1.3)  x10^3/uL


 


Absolute Nucleated RBC     (0.00-0.01)  x10^3u/L


 


Lymphocytes %     (24.0-44.0)  %


 


Monocytes %     (0.0-12.0)  %


 


Eosinophils %     (0.00-5.0)  %


 


Basophils %     (0.0-0.4)  %


 


Absolute Granulocytes     (1.4-6.9)  x10^3/uL


 


Basophils #     (0-0.4)  x10^3/uL


 


PT     (9.4-12.5)  SECONDS


 


INR     (0.8-3.0)  


 


APTT     (25.1-36.5)  SECONDS


 


D-Dimer     (0.0-0.50)  mg/L


 


Sodium     (137-145)  mmol/L


 


Potassium     (3.5-5.1)  mmol/L


 


Chloride     ()  mmol/L


 


Carbon Dioxide     (22-30)  mmol/L


 


Anion Gap     (5-15)  MEQ/L


 


BUN     (7-17)  mg/dL


 


Creatinine     (0.52-1.04)  mg/dL


 


Estimated GFR     ML/MIN


 


Glucose     ()  mg/dL


 


POC Glucometer  106   104  (74 to 106)  mg/dL


 


Hemoglobin A1c     (4.5-6.0)  %


 


Lactic Acid     (0.4-2.0)  


 


Calcium     (8.4-10.2)  mg/dL


 


Total Bilirubin     (0.2-1.3)  mg/dL


 


AST     (14-36)  U/L


 


ALT     (0-35)  U/L


 


Alkaline Phosphatase     ()  U/L


 


Troponin I   < 0.012   (0.000-0.034)  ng/mL


 


NT-Pro-B Natriuret Pep     (<300)  pg/mL


 


Serum Total Protein     (6.3-8.2)  g/dL


 


Albumin     (3.5-5.0)  g/dL


 


Triglycerides     ()  mg/dL


 


Cholesterol     ()  mg/dL


 


LDL Cholesterol     ()  mg/dL


 


HDL Cholesterol     (40-60)  mg/dL


 


Heart Disease Risk Ratio     


 


Free T4     (0.78-2.19)  ng/dL


 


TSH 3rd Generation     (0.47-4.68)  mIU/L


 


Influenza Type A Ag     (NEGATIVE)  


 


Influenza Type B Ag     (NEGATIVE)  


 


RSV (PCR)     (NEGATIVE)  


 


SARS-CoV-2 (PCR)     (NEGATIVE)  














  05/11/23 05/11/23 05/11/23 Range/Units





  05:03 05:03 05:03 


 


WBC  7.2    (4.0-10.5)  x10^3/uL


 


RBC  4.57    (4.1-5.4)  x10^6/uL


 


Hgb  13.7    (12.0-16.0)  g/dL


 


Hct  43.0    (35-47)  %


 


MCV  94.1    ()  fL


 


MCH  30.0    (26-32)  pg


 


MCHC  31.9 L    (32-36)  g/dL


 


RDW  13.6    (11.5-14.0)  %


 


Plt Count  222    (150-450)  x10^3/uL


 


MPV  10.7    (7.5-11.0)  fL


 


Gran %  83.6 H    (36.0-66.0)  %


 


Immature Gran % (Auto)  1.0 H    (0.00-0.4)  %


 


Nucleat RBC Rel Count  0.0    (0.00-0.1)  %


 


Eos # (Auto)  0.01    (0-0.5)  x10^3/uL


 


Immature Gran # (Auto)  0.07 H    (0.00-0.03)  x10^3u/L


 


Absolute Lymphs (auto)  1.00    (1.0-4.6)  x10^3/uL


 


Absolute Monos (auto)  0.06    (0.0-1.3)  x10^3/uL


 


Absolute Nucleated RBC  0.00    (0.00-0.01)  x10^3u/L


 


Lymphocytes %  13.8 L    (24.0-44.0)  %


 


Monocytes %  0.8    (0.0-12.0)  %


 


Eosinophils %  0.1    (0.00-5.0)  %


 


Basophils %  0.7    (0.0-0.4)  %


 


Absolute Granulocytes  6.04    (1.4-6.9)  x10^3/uL


 


Basophils #  0.05    (0-0.4)  x10^3/uL


 


PT     (9.4-12.5)  SECONDS


 


INR     (0.8-3.0)  


 


APTT     (25.1-36.5)  SECONDS


 


D-Dimer     (0.0-0.50)  mg/L


 


Sodium   139   (137-145)  mmol/L


 


Potassium   4.8   (3.5-5.1)  mmol/L


 


Chloride   105   ()  mmol/L


 


Carbon Dioxide   25   (22-30)  mmol/L


 


Anion Gap   13.6   (5-15)  MEQ/L


 


BUN   16   (7-17)  mg/dL


 


Creatinine   0.98   (0.52-1.04)  mg/dL


 


Estimated GFR   > 60.0   ML/MIN


 


Glucose   171 H   ()  mg/dL


 


POC Glucometer     (74 to 106)  mg/dL


 


Hemoglobin A1c     (4.5-6.0)  %


 


Lactic Acid     (0.4-2.0)  


 


Calcium   8.5   (8.4-10.2)  mg/dL


 


Total Bilirubin   0.50   (0.2-1.3)  mg/dL


 


AST   29   (14-36)  U/L


 


ALT   29   (0-35)  U/L


 


Alkaline Phosphatase   65   ()  U/L


 


Troponin I     (0.000-0.034)  ng/mL


 


NT-Pro-B Natriuret Pep     (<300)  pg/mL


 


Serum Total Protein   7.8   (6.3-8.2)  g/dL


 


Albumin   4.3   (3.5-5.0)  g/dL


 


Triglycerides    123  ()  mg/dL


 


Cholesterol    135  ()  mg/dL


 


LDL Cholesterol    81  ()  mg/dL


 


HDL Cholesterol    27 L  (40-60)  mg/dL


 


Heart Disease Risk Ratio    4.9  


 


Free T4     (0.78-2.19)  ng/dL


 


TSH 3rd Generation     (0.47-4.68)  mIU/L


 


Influenza Type A Ag     (NEGATIVE)  


 


Influenza Type B Ag     (NEGATIVE)  


 


RSV (PCR)     (NEGATIVE)  


 


SARS-CoV-2 (PCR)     (NEGATIVE)  














  05/11/23 Range/Units





  07:14 


 


WBC   (4.0-10.5)  x10^3/uL


 


RBC   (4.1-5.4)  x10^6/uL


 


Hgb   (12.0-16.0)  g/dL


 


Hct   (35-47)  %


 


MCV   ()  fL


 


MCH   (26-32)  pg


 


MCHC   (32-36)  g/dL


 


RDW   (11.5-14.0)  %


 


Plt Count   (150-450)  x10^3/uL


 


MPV   (7.5-11.0)  fL


 


Gran %   (36.0-66.0)  %


 


Immature Gran % (Auto)   (0.00-0.4)  %


 


Nucleat RBC Rel Count   (0.00-0.1)  %


 


Eos # (Auto)   (0-0.5)  x10^3/uL


 


Immature Gran # (Auto)   (0.00-0.03)  x10^3u/L


 


Absolute Lymphs (auto)   (1.0-4.6)  x10^3/uL


 


Absolute Monos (auto)   (0.0-1.3)  x10^3/uL


 


Absolute Nucleated RBC   (0.00-0.01)  x10^3u/L


 


Lymphocytes %   (24.0-44.0)  %


 


Monocytes %   (0.0-12.0)  %


 


Eosinophils %   (0.00-5.0)  %


 


Basophils %   (0.0-0.4)  %


 


Absolute Granulocytes   (1.4-6.9)  x10^3/uL


 


Basophils #   (0-0.4)  x10^3/uL


 


PT   (9.4-12.5)  SECONDS


 


INR   (0.8-3.0)  


 


APTT   (25.1-36.5)  SECONDS


 


D-Dimer   (0.0-0.50)  mg/L


 


Sodium   (137-145)  mmol/L


 


Potassium   (3.5-5.1)  mmol/L


 


Chloride   ()  mmol/L


 


Carbon Dioxide   (22-30)  mmol/L


 


Anion Gap   (5-15)  MEQ/L


 


BUN   (7-17)  mg/dL


 


Creatinine   (0.52-1.04)  mg/dL


 


Estimated GFR   ML/MIN


 


Glucose   ()  mg/dL


 


POC Glucometer  207 H  (74 to 106)  mg/dL


 


Hemoglobin A1c   (4.5-6.0)  %


 


Lactic Acid   (0.4-2.0)  


 


Calcium   (8.4-10.2)  mg/dL


 


Total Bilirubin   (0.2-1.3)  mg/dL


 


AST   (14-36)  U/L


 


ALT   (0-35)  U/L


 


Alkaline Phosphatase   ()  U/L


 


Troponin I   (0.000-0.034)  ng/mL


 


NT-Pro-B Natriuret Pep   (<300)  pg/mL


 


Serum Total Protein   (6.3-8.2)  g/dL


 


Albumin   (3.5-5.0)  g/dL


 


Triglycerides   ()  mg/dL


 


Cholesterol   ()  mg/dL


 


LDL Cholesterol   ()  mg/dL


 


HDL Cholesterol   (40-60)  mg/dL


 


Heart Disease Risk Ratio   


 


Free T4   (0.78-2.19)  ng/dL


 


TSH 3rd Generation   (0.47-4.68)  mIU/L


 


Influenza Type A Ag   (NEGATIVE)  


 


Influenza Type B Ag   (NEGATIVE)  


 


RSV (PCR)   (NEGATIVE)  


 


SARS-CoV-2 (PCR)   (NEGATIVE)  











Radiology Exams: 


                              Radiology Procedures











 Category Date Time Status


 


 CHEST 1 VIEW (PORTABLE) Stat Exams  05/10/23 11:01 Completed


 


 CHEST WITH CONTRAST [CT] Stat Exams  05/10/23 13:08 Completed


 


 ECHO W/2D AND DOPPLER [US] Routine Exams  05/11/23 08:00 Ordered














Assessment/Plan


(1) Atrial flutter with rapid ventricular response


Current Visit: Yes   Status: Acute   


Assessment & Plan: 


change over to po toprol xl25mg this am, monitor bp. d/c lisinopril. echo 

pending, MI ruled out. will start po eliquis as well


Code(s): I48.92 - UNSPECIFIED ATRIAL FLUTTER   





(2) COPD exacerbation


Current Visit: Yes   Status: Acute   


Assessment & Plan: 


much improved today on exam


Code(s): J44.1 - CHRONIC OBSTRUCTIVE PULMONARY DISEASE W (ACUTE) EXACERBATION   





(3) Type 2 diabetes mellitus


Current Visit: No   Status: Acute

## 2023-05-12 VITALS — OXYGEN SATURATION: 91 % | HEART RATE: 87 BPM | SYSTOLIC BLOOD PRESSURE: 116 MMHG | DIASTOLIC BLOOD PRESSURE: 81 MMHG

## 2023-05-12 LAB
ANION GAP SERPL CALC-SCNC: 13.9 MEQ/L (ref 5–15)
BASOPHILS # BLD AUTO: 0.03 X10^3/UL (ref 0–0.4)
BASOPHILS NFR BLD AUTO: 0.1 % (ref 0–0.4)
BUN SERPL-MCNC: 22 MG/DL (ref 7–17)
CALCIUM SPEC-MCNC: 8.4 MG/DL (ref 8.4–10.2)
CHLORIDE SERPL-SCNC: 107 MMOL/L (ref 98–107)
CO2 SERPL-SCNC: 26 MMOL/L (ref 22–30)
CREAT SERPL-MCNC: 0.99 MG/DL (ref 0.52–1.04)
EOSINOPHIL # BLD AUTO: 0 X10^3/UL (ref 0–0.5)
GFR SERPLBLD BASED ON 1.73 SQ M-ARVRAT: > 60 ML/MIN
GLUCOSE SERPL-MCNC: 188 MG/DL (ref 74–106)
HCT VFR BLD AUTO: 42.5 % (ref 35–47)
HGB BLD-MCNC: 13.1 G/DL (ref 12–16)
IMM GRANULOCYTES # BLD: 0.29 X10^3U/L (ref 0–0.03)
IMM GRANULOCYTES NFR BLD: 1.4 % (ref 0–0.4)
LYMPHOCYTES # SPEC AUTO: 1.13 X10^3/UL (ref 1–4.6)
MAGNESIUM SERPL-MCNC: 2.4 MG/DL (ref 1.6–2.3)
MCH RBC QN AUTO: 29.4 PG (ref 26–32)
MCHC RBC AUTO-ENTMCNC: 30.8 G/DL (ref 32–36)
MONOCYTES # BLD AUTO: 0.67 X10^3/UL (ref 0–1.3)
NRBC # BLD AUTO: 0 X10^3U/L (ref 0–0.01)
NRBC BLD AUTO-RTO: 0 % (ref 0–0.1)
PLATELET # BLD AUTO: 234 X10^3/UL (ref 150–450)
POTASSIUM SERPLBLD-SCNC: 5.7 MMOL/L (ref 3.5–5.1)
RBC # BLD AUTO: 4.45 X10^6/UL (ref 4.1–5.4)
SODIUM SERPL-SCNC: 141 MMOL/L (ref 137–145)
WBC # BLD AUTO: 21.4 X10^3/UL (ref 4–10.5)

## 2023-05-12 RX ADMIN — LEVALBUTEROL HYDROCHLORIDE SCH MG: 1.25 SOLUTION, CONCENTRATE RESPIRATORY (INHALATION) at 07:03

## 2023-05-12 RX ADMIN — ISODIUM CHLORIDE SCH ML: 0.03 SOLUTION RESPIRATORY (INHALATION) at 02:49

## 2023-05-12 RX ADMIN — LEVALBUTEROL HYDROCHLORIDE SCH MG: 1.25 SOLUTION, CONCENTRATE RESPIRATORY (INHALATION) at 02:49

## 2023-05-12 RX ADMIN — WATER SCH MG: 1 INJECTION INTRAMUSCULAR; INTRAVENOUS; SUBCUTANEOUS at 05:45

## 2023-05-12 RX ADMIN — LEVOTHYROXINE SODIUM SCH MCG: 150 TABLET ORAL at 05:45

## 2023-05-12 RX ADMIN — FLUTICASONE PROPIONATE AND SALMETEROL XINAFOATE SCH PUFF: 115; 21 AEROSOL, METERED RESPIRATORY (INHALATION) at 07:04

## 2023-05-12 NOTE — PCM.DS
Discharge Summary


Date of Admission: 


05/10/23 16:25





Admitting Physician: 


TOM BESS





Primary Care Provider: 


JOSH CORREA








Allergies


Allergies





codeine Allergy (Severe, Verified 05/10/23 10:59)


   Shortness of Breath


morphine Allergy (Verified 05/10/23 10:59)


   











Hospital Summary





- Hospital Course


Hospital Course: 





patient admitted with new onset a flutter with RVR, was seen in Dr Olmstead's 

office and sent to ER, her rate is currently controlled with low dose drip of 

cardizem, she feels much better. was also treated for copd exacerbation, thyroid

functions were normal. echo prelim report EF 60% with mild TR. plan outpatient 

stress test





- Vitals & Intake/Output


Vital Signs: 





                                   Vital Signs











Temperature  99.4 F   05/12/23 06:00


 


Pulse Rate  82   05/12/23 07:07


 


Respiratory Rate  16   05/12/23 07:07


 


Blood Pressure  120/77   05/12/23 07:00


 


O2 Sat by Pulse Oximetry  95   05/12/23 07:07











Intake & Output: 





                                 Intake & Output











 05/09/23 05/10/23 05/11/23 05/12/23





 11:59 11:59 11:59 11:59


 


Intake Total   928 240


 


Output Total   1200 1200


 


Balance   -272 -960


 


Weight  139.9 kg 137.3 kg 














- Lab


Result Diagrams: 


                                 05/12/23 05:20





                                 05/12/23 05:20


Lab Results-Last 24 Hrs: 





                            Lab Results-Last 24 Hours











  05/11/23 05/11/23 05/11/23 Range/Units





  11:28 16:38 21:23 


 


WBC     (4.0-10.5)  x10^3/uL


 


RBC     (4.1-5.4)  x10^6/uL


 


Hgb     (12.0-16.0)  g/dL


 


Hct     (35-47)  %


 


MCV     ()  fL


 


MCH     (26-32)  pg


 


MCHC     (32-36)  g/dL


 


RDW     (11.5-14.0)  %


 


Plt Count     (150-450)  x10^3/uL


 


MPV     (7.5-11.0)  fL


 


Gran %     (36.0-66.0)  %


 


Immature Gran % (Auto)     (0.00-0.4)  %


 


Nucleat RBC Rel Count     (0.00-0.1)  %


 


Eos # (Auto)     (0-0.5)  x10^3/uL


 


Immature Gran # (Auto)     (0.00-0.03)  x10^3u/L


 


Absolute Lymphs (auto)     (1.0-4.6)  x10^3/uL


 


Absolute Monos (auto)     (0.0-1.3)  x10^3/uL


 


Absolute Nucleated RBC     (0.00-0.01)  x10^3u/L


 


Lymphocytes %     (24.0-44.0)  %


 


Monocytes %     (0.0-12.0)  %


 


Eosinophils %     (0.00-5.0)  %


 


Basophils %     (0.0-0.4)  %


 


Absolute Granulocytes     (1.4-6.9)  x10^3/uL


 


Basophils #     (0-0.4)  x10^3/uL


 


Sodium     (137-145)  mmol/L


 


Potassium     (3.5-5.1)  mmol/L


 


Chloride     ()  mmol/L


 


Carbon Dioxide     (22-30)  mmol/L


 


Anion Gap     (5-15)  MEQ/L


 


BUN     (7-17)  mg/dL


 


Creatinine     (0.52-1.04)  mg/dL


 


Estimated GFR     ML/MIN


 


Glucose     ()  mg/dL


 


POC Glucometer  201 H  195 H  199 H  (74 to 106)  mg/dL


 


Calcium     (8.4-10.2)  mg/dL


 


Magnesium     (1.6-2.3)  mg/dL














  05/12/23 05/12/23 Range/Units





  05:20 05:20 


 


WBC  21.4 H   (4.0-10.5)  x10^3/uL


 


RBC  4.45   (4.1-5.4)  x10^6/uL


 


Hgb  13.1   (12.0-16.0)  g/dL


 


Hct  42.5   (35-47)  %


 


MCV  95.5   ()  fL


 


MCH  29.4   (26-32)  pg


 


MCHC  30.8 L   (32-36)  g/dL


 


RDW  13.5   (11.5-14.0)  %


 


Plt Count  234   (150-450)  x10^3/uL


 


MPV  11.1 H   (7.5-11.0)  fL


 


Gran %  90.1 H   (36.0-66.0)  %


 


Immature Gran % (Auto)  1.4 H   (0.00-0.4)  %


 


Nucleat RBC Rel Count  0.0   (0.00-0.1)  %


 


Eos # (Auto)  0   (0-0.5)  x10^3/uL


 


Immature Gran # (Auto)  0.29 H   (0.00-0.03)  x10^3u/L


 


Absolute Lymphs (auto)  1.13   (1.0-4.6)  x10^3/uL


 


Absolute Monos (auto)  0.67   (0.0-1.3)  x10^3/uL


 


Absolute Nucleated RBC  0.00   (0.00-0.01)  x10^3u/L


 


Lymphocytes %  5.3 L   (24.0-44.0)  %


 


Monocytes %  3.1   (0.0-12.0)  %


 


Eosinophils %  0.0   (0.00-5.0)  %


 


Basophils %  0.1   (0.0-0.4)  %


 


Absolute Granulocytes  19.27 H   (1.4-6.9)  x10^3/uL


 


Basophils #  0.03   (0-0.4)  x10^3/uL


 


Sodium   141  (137-145)  mmol/L


 


Potassium   5.7 H  (3.5-5.1)  mmol/L


 


Chloride   107  ()  mmol/L


 


Carbon Dioxide   26  (22-30)  mmol/L


 


Anion Gap   13.9  (5-15)  MEQ/L


 


BUN   22 H  (7-17)  mg/dL


 


Creatinine   0.99  (0.52-1.04)  mg/dL


 


Estimated GFR   > 60.0  ML/MIN


 


Glucose   188 H  ()  mg/dL


 


POC Glucometer    (74 to 106)  mg/dL


 


Calcium   8.4  (8.4-10.2)  mg/dL


 


Magnesium   2.4 H  (1.6-2.3)  mg/dL











Micro Results-Entire Visit: 





                                   Accuchecks











Date                           05/11/23


 


Time                           17:02

















- Radiology Exams


Ordered Rad Exams-Entire Visit: 





                              Radiology Procedures











 Category Date Time Status


 


 CHEST 1 VIEW (PORTABLE) Stat Exams  05/10/23 11:01 Completed


 


 CHEST WITH CONTRAST [CT] Stat Exams  05/10/23 13:08 Completed


 


 ECHO W/2D AND DOPPLER [US] Routine Exams  05/11/23 08:00 Taken














- Procedures and Test


Procedures and Tests throughout Hospitalization: 





                            Therapy Orders & Screens





05/10/23 15:33


Oxygen Nasal Cannula 2 lpm 


   Comment: 





05/10/23 17:16


Smoking Cessation Education ONCE 


   Comment: 


   Diagnosis: Aflutter w RVR


   Smoking Status: Current every day smoker


   How long have you smoked: 41 years


   Have you smoked in the past 12 months: Yes


   Approximately how many cigarettes per day: 20


   Do you dip or chew tobacco: No














Discharge Exam


General Appearance: no apparent distress, obese


Neurologic Exam: alert, oriented x 3


Respiratory Exam: prolonged expirations


Cardiovascular Exam: regular rate/rhythm, normal heart sounds


Gastrointestinal/Abdomen Exam: soft


Extremity Exam: normal inspection, normal range of motion


Skin Exam: normal color, warm, dry





Final Diagnosis/Problem List





- Final Discharge Diagnosis/Problem


(1) Atrial flutter with rapid ventricular response


Current Visit: Yes   Status: Acute   


Assessment & Plan: 


cardizem, eliquis


Code(s): I48.92 - UNSPECIFIED ATRIAL FLUTTER   





(2) COPD exacerbation


Current Visit: Yes   Status: Acute   


Assessment & Plan: 


po prednisone, combivent inhaler, levaquin


Code(s): J44.1 - CHRONIC OBSTRUCTIVE PULMONARY DISEASE W (ACUTE) EXACERBATION   





(3) Type 2 diabetes mellitus


Current Visit: No   Status: Acute   





- Discharge


Disposition: Home, Self-Care


Condition: Stable


Prescriptions: 


New


   dilTIAZem HCl [Cardizem Cd] 180 mg PO DAILY #30 cap


   Ipratropium/Albuterol Sulfate [Combivent Respimat Common Canister] 1 puff IH 

QID #1 unit


   Prednisone 20 mg*** [Deltasone 20 mg***] 20 mg PO UD #18 tablet


   Apixaban [Eliquis] 5 mg PO BID #60 tablet


   Levofloxacin*** [Levofloxacin 500 MG Tablet***] 500 mg PO DAILY #5 tablet





Continue


   Doxepin HCl 75 mg PO HS


   clonazePAM [Klonopin] 1 mg PO TID


   Vortioxetine Hydrobromide [Trintellix] 20 mg PO DAILY


   Levothyroxine Sodium [Unithroid] 150 mcg PO 0600


   Linagliptin [Tradjenta] 5 mg PO DAILY


   Aripiprazole 10 mg*** [Abilify 10 MG***] 10 mg PO DAILY


   Lisinopril 10 mg*** [Zestril 10 MG***] 10 mg PO DAILY


   Fenofibrate 54 mg PO DAILY


   Cyclobenzaprine HCl 10 mg*** [Cyclobenzaprine 10 MG***] 10 mg PO BID


   Fluticasone/Vilanterol [Breo Ellipta 200-25 Mcg INH] 1 each IH DAILY


   Alendronate Sodium [Fosamax] 70 mg PO WEEKLY


   Rosuvastatin Calcium 10 mg PO DAILY


   Metformin HCl 500 mg*** [Glucophage 500 MG***] 500 mg PO BID


   Nitroglycerin 0.4 mg Tablet*** [Nitrostat 0.4 MG Tablet***] 0.4 mg SL Q5MIN 

PRN MR X 3 PRN


     PRN Reason: Chest Pain


Outpatient Orders: 


Stress Test: Lexiscan Facility: [facility], Location: 

RESPIRATORY THERAPY


Follow up with: 


JOSH CORREA NP [Primary Care Provider] - 


ALMA OLMSTEAD [CONSULTING PHYSICIAN] -